# Patient Record
Sex: MALE | Race: WHITE | Employment: OTHER | URBAN - METROPOLITAN AREA
[De-identification: names, ages, dates, MRNs, and addresses within clinical notes are randomized per-mention and may not be internally consistent; named-entity substitution may affect disease eponyms.]

---

## 2022-06-23 ENCOUNTER — INITIAL CONSULT (OUTPATIENT)
Dept: CARDIOLOGY CLINIC | Age: 77
End: 2022-06-23
Payer: MEDICARE

## 2022-06-23 VITALS
BODY MASS INDEX: 28.88 KG/M2 | HEIGHT: 67 IN | HEART RATE: 61 BPM | SYSTOLIC BLOOD PRESSURE: 128 MMHG | WEIGHT: 184 LBS | DIASTOLIC BLOOD PRESSURE: 72 MMHG

## 2022-06-23 DIAGNOSIS — I25.10 ATHEROSCLEROSIS OF NATIVE CORONARY ARTERY OF NATIVE HEART WITHOUT ANGINA PECTORIS: ICD-10-CM

## 2022-06-23 DIAGNOSIS — Z76.89 ESTABLISHING CARE WITH NEW DOCTOR, ENCOUNTER FOR: Primary | ICD-10-CM

## 2022-06-23 DIAGNOSIS — I77.9 CAROTID ARTERY DISEASE, UNSPECIFIED LATERALITY (HCC): ICD-10-CM

## 2022-06-23 PROCEDURE — 99204 OFFICE O/P NEW MOD 45 MIN: CPT | Performed by: INTERNAL MEDICINE

## 2022-06-23 PROCEDURE — 1123F ACP DISCUSS/DSCN MKR DOCD: CPT | Performed by: INTERNAL MEDICINE

## 2022-06-23 PROCEDURE — 93000 ELECTROCARDIOGRAM COMPLETE: CPT | Performed by: INTERNAL MEDICINE

## 2022-06-23 RX ORDER — ROSUVASTATIN CALCIUM 40 MG/1
40 TABLET, COATED ORAL
COMMUNITY
Start: 2022-04-13

## 2022-06-23 RX ORDER — AMLODIPINE BESYLATE 5 MG/1
5 TABLET ORAL DAILY
COMMUNITY
Start: 2022-01-03

## 2022-06-23 RX ORDER — CELECOXIB 200 MG/1
200 CAPSULE ORAL 2 TIMES DAILY PRN
COMMUNITY
Start: 2022-05-16

## 2022-06-23 RX ORDER — TORSEMIDE 10 MG/1
10 TABLET ORAL DAILY
COMMUNITY
Start: 2022-04-13

## 2022-06-23 RX ORDER — DOXAZOSIN MESYLATE 4 MG/1
4 TABLET ORAL
COMMUNITY
Start: 2022-05-13

## 2022-06-23 RX ORDER — EZETIMIBE 10 MG/1
10 TABLET ORAL DAILY
COMMUNITY
Start: 2022-04-27

## 2022-06-23 RX ORDER — COLCHICINE 0.6 MG/1
CAPSULE ORAL AS NEEDED
COMMUNITY

## 2022-06-23 RX ORDER — OMEPRAZOLE 40 MG/1
40 CAPSULE, DELAYED RELEASE ORAL DAILY
COMMUNITY
Start: 2022-04-13

## 2022-06-23 NOTE — PROGRESS NOTES
ezetimibe (ZETIA) 10 MG tablet (Taking)    Class: Historical Med    rosuvastatin (CRESTOR) 40 MG tablet (Taking)    Class: Historical Med    torsemide (DEMADEX) 10 MG tablet (Taking)    Class: Historical Med              Past Medical History, Past Surgical History, Family history, Social History, and Medications were all reviewed with the patient today and updated as necessary. Allergies   Allergen Reactions    Lisinopril Swelling     angioedema  angioedema      Lorazepam Hallucinations    Sulfa Antibiotics Rash     No past medical history on file. No past surgical history on file. No family history on file. Social History     Tobacco Use    Smoking status: Former Smoker     Types: Cigarettes    Smokeless tobacco: Never Used   Substance Use Topics    Alcohol use: Not on file       ROS:    Review of Systems   Constitutional: Negative for fever. HENT: Negative for stridor. Eyes: Negative for pain. Cardiovascular: Negative for chest pain. Respiratory: Negative for cough. Endocrine: Negative for cold intolerance. Skin: Negative for nail changes. Musculoskeletal: Negative for arthritis. Gastrointestinal: Negative for abdominal pain. Genitourinary: Negative for dysuria. Neurological: Negative for aphonia. Psychiatric/Behavioral: Negative for altered mental status. Allergic/Immunologic: Negative for hives. PHYSICAL EXAM:   /72   Pulse 61   Ht 5' 7\" (1.702 m)   Wt 184 lb (83.5 kg)   BMI 28.82 kg/m²      Physical Exam  Vitals reviewed. HENT:      Head: Normocephalic. Right Ear: External ear normal.      Left Ear: External ear normal.      Nose: Nose normal.   Eyes:      General: No scleral icterus. Cardiovascular:      Heart sounds: Murmur heard. Pulmonary:      Effort: Pulmonary effort is normal.   Abdominal:      General: There is no distension. Musculoskeletal:      Cervical back: Neck supple. Skin:     General: Skin is warm.    Neurological:      Mental Status: He is alert. Mental status is at baseline. Medical problems and test results were reviewed with the patient today. No results found for any visits on 06/23/22. No results found for this or any previous visit (from the past 672 hour(s)). No results found for: CHOL, CHOLPOCT, CHOLX, CHLST, CHOLV, HDL, HDLPOC, HDLC, LDL, LDLC, VLDLC, VLDL, TGLX, TRIGL         PLAN    Kym Ramirez was seen today for consultation. Diagnoses and all orders for this visit:    Establishing care with new doctor, encounter for  -     EKG 12 Lead    Atherosclerotic heart disease of native coronary artery without angina pectoris  -     EKG 12 Lead    Carotid artery disease, unspecified laterality ([address]  -     Vascular duplex carotid left; Future      Return in about 6 months (around 12/23/2022). Thank you for allowing me to participate in this patient's care. Please call or contact me if there are any questions or concerns regarding the above.       Nikhil Graham MD  06/23/22  3:40 PM      Proofread, but unrecognized errors may exist.

## 2022-06-29 ASSESSMENT — ENCOUNTER SYMPTOMS
STRIDOR: 0
APHONIA: 0
COUGH: 0
NAIL CHANGES: 0
ABDOMINAL PAIN: 0
EYE PAIN: 0

## 2022-07-28 DIAGNOSIS — I77.9 CAROTID ARTERY DISEASE, UNSPECIFIED LATERALITY (HCC): Primary | ICD-10-CM

## 2022-09-02 ENCOUNTER — TELEPHONE (OUTPATIENT)
Dept: CARDIOLOGY CLINIC | Age: 77
End: 2022-09-02

## 2022-09-02 NOTE — TELEPHONE ENCOUNTER
Cardiac Clearance        Physician or Practice Requesting:[facility]  : Sandip Olivera.   Contact Phone Number: [phone]  Fax Number: [phone]  Date of Surgery/Procedure: TBD  Type of Surgery or Procedure: Spinal  Type of Anesthesia: Not Noted      Type of Clearance Requested:   Medication to Hold:Cardiologist to Determine  Days to Hold: Cardiologist to Determine

## 2022-09-02 NOTE — LETTER
[address] [address] [address] [place] [address] [address] [place] [address], [address] [place] [address]  [phone]  _____________________________________      PRE-OP RISK ASSESSMENT    uSsie Sampson  1945    Susie Sampson is scheduled for spinal with [facility]. Typically anticoagulation is held 72 hours prior to this type of procedure. As always, temporary discontinuation of anticoagulation therapy does place patient at minimally increased risk of embolic events. A risk-benefit decision should be made by the physician performing procedure when deciding to hold anticoagulation.     Cardiac symptoms are stable at the time of last appointment.                     Thank you,     Dr. Sandro Chew          9/7/2022  1:34 PM

## 2022-09-03 NOTE — TELEPHONE ENCOUNTER
Typically anticoagulation is held 72 hours prior to this type of procedure. As always, temporary discontinuation of anticoagulation therapy does place patient at minimally increased risk of embolic events. A risk-benefit decision should be made by the physician performing procedure when deciding to hold anticoagulation. Cardiac symptoms are stable at the time of last appointment.

## 2022-12-20 ENCOUNTER — OFFICE VISIT (OUTPATIENT)
Dept: CARDIOLOGY CLINIC | Age: 77
End: 2022-12-20
Payer: MEDICARE

## 2022-12-20 VITALS
HEART RATE: 68 BPM | BODY MASS INDEX: 28.41 KG/M2 | HEIGHT: 67 IN | WEIGHT: 181 LBS | DIASTOLIC BLOOD PRESSURE: 70 MMHG | SYSTOLIC BLOOD PRESSURE: 132 MMHG

## 2022-12-20 DIAGNOSIS — I20.9 ANGINA, CLASS III (HCC): Primary | ICD-10-CM

## 2022-12-20 DIAGNOSIS — I77.9 CAROTID ARTERY DISEASE, UNSPECIFIED LATERALITY (HCC): ICD-10-CM

## 2022-12-20 DIAGNOSIS — I35.0 AORTIC VALVE STENOSIS, ETIOLOGY OF CARDIAC VALVE DISEASE UNSPECIFIED: ICD-10-CM

## 2022-12-20 PROCEDURE — 99214 OFFICE O/P EST MOD 30 MIN: CPT | Performed by: INTERNAL MEDICINE

## 2022-12-20 PROCEDURE — 1123F ACP DISCUSS/DSCN MKR DOCD: CPT | Performed by: INTERNAL MEDICINE

## 2022-12-20 ASSESSMENT — ENCOUNTER SYMPTOMS
STRIDOR: 0
APHONIA: 0
COUGH: 0
NAIL CHANGES: 0
EYE PAIN: 0
ABDOMINAL PAIN: 0

## 2022-12-20 NOTE — PROGRESS NOTES
[address], [address]  PHONE: [phone]    SUBJECTIVE:   Maxwell Elizalde is a 65 y.o. male 1945      Chief Complaint   Patient presents with    Coronary Artery Disease    6 Month Follow-Up          Coronary Artery Disease and 6 Month Follow-Up   . History of present illness: 65 y.o. male presented for follow-up 12/20/22 he had recent lumbar surgery at [facility]. He plans on playing golf when he recovers from his most recent surgery. No shortness of breath or chest discomfort. He is having events of palpitations that he is concerned are consistent with atrial fibrillation. Interval history:  6/23/2022 previous history of coronary artery disease status post coronary artery bypass grafting in 1985 PCI in 2014. Additional history of paroxysmal atrial fibrillation. Carotid studies performed in the past with mildly elevated velocities. Cardiac History:  Coronary artery bypass grafting in 1985  PCI 2014 2018 cardioversion  Echocardiogram 5/2020 1/2022 echocardiogram [facility] ejection fraction 55 to 65% mild aortic stenosis.   Grade 2 diastolic dysfunction  Assessment:  Coronary artery disease  Key CAD CHF Meds            amLODIPine (NORVASC) 5 MG tablet (Taking)    Class: Historical Med    apixaban (ELIQUIS) 5 MG TABS tablet (Taking)    Class: Historical Med    doxazosin (CARDURA) 4 MG tablet (Taking)    Class: Historical Med    ezetimibe (ZETIA) 10 MG tablet (Taking)    Class: Historical Med    rosuvastatin (CRESTOR) 40 MG tablet (Taking)    Class: Historical Med    torsemide (DEMADEX) 10 MG tablet (Taking)    Class: Historical Med           Aortic stenosis  Serial echocardiograms   Per guidelines    Carotid disease  Moderate carotid disease  Serial imaging per guidelines    History of atrial fibrillation  Discussed wearable device  Key CAD CHF Meds            amLODIPine (NORVASC) 5 MG tablet (Taking)    Class: Historical Med    apixaban (ELIQUIS) 5 MG TABS tablet (Taking)    Class: Historical Med    doxazosin (CARDURA) 4 MG tablet (Taking)    Class: Historical Med    ezetimibe (ZETIA) 10 MG tablet (Taking)    Class: Historical Med    rosuvastatin (CRESTOR) 40 MG tablet (Taking)    Class: Historical Med    torsemide (DEMADEX) 10 MG tablet (Taking)    Class: Historical Med              Past Medical History, Past Surgical History, Family history, Social History, and Medications were all reviewed with the patient today and updated as necessary. Allergies   Allergen Reactions    Lisinopril Swelling     angioedema  angioedema      Lorazepam Hallucinations    Sulfa Antibiotics Rash     No past medical history on file. No past surgical history on file. No family history on file. Social History     Tobacco Use    Smoking status: Former     Types: Cigarettes    Smokeless tobacco: Never   Substance Use Topics    Alcohol use: Not on file       ROS:    Review of Systems   Constitutional: Negative for fever. HENT:  Negative for stridor. Eyes:  Negative for pain. Cardiovascular:  Negative for chest pain. Respiratory:  Negative for cough. Endocrine: Negative for cold intolerance. Skin:  Negative for nail changes. Musculoskeletal:  Negative for arthritis. Gastrointestinal:  Negative for abdominal pain. Genitourinary:  Negative for dysuria. Neurological:  Negative for aphonia. Psychiatric/Behavioral:  Negative for altered mental status. Allergic/Immunologic: Negative for hives. PHYSICAL EXAM:   /70   Pulse 68   Ht 5' 7\" (1.702 m)   Wt 181 lb (82.1 kg)   BMI 28.35 kg/m²      Physical Exam  Vitals reviewed. HENT:      Head: Normocephalic. Right Ear: External ear normal.      Left Ear: External ear normal.      Nose: Nose normal.   Eyes:      General: No scleral icterus. Cardiovascular:      Heart sounds: Murmur heard. Pulmonary:      Effort: Pulmonary effort is normal.   Abdominal:      General: There is no distension. Musculoskeletal:      Cervical back: Neck supple. Skin:     General: Skin is warm. Neurological:      Mental Status: He is alert. Mental status is at baseline. Medical problems and test results were reviewed with the patient today. No results found for any visits on 12/20/22. No results found for this or any previous visit (from the past 672 hour(s)). No results found for: CHOL, CHOLPOCT, CHOLX, CHLST, CHOLV, HDL, HDLPOC, HDLC, LDL, LDLC, VLDLC, VLDL, TGLX, TRIGL         PLAN    Arun Aguiar was seen today for coronary artery disease and 6 month follow-up. Diagnoses and all orders for this visit:    Angina, class III ([address]    Aortic valve stenosis, etiology of cardiac valve disease unspecified  -     Transthoracic echocardiogram (TTE) complete with contrast, bubble, strain, and 3D PRN; Future    Carotid artery disease, unspecified laterality ([address]  -     Vascular duplex carotid bilateral; Future    Return in about 6 months (around 6/20/2023). Thank you for allowing me to participate in this patient's care. Please call or contact me if there are any questions or concerns regarding the above.       Carley Prasad MD  12/20/22  6:42 PM      Proofread, but unrecognized errors may exist.

## 2023-06-28 ENCOUNTER — OFFICE VISIT (OUTPATIENT)
Age: 78
End: 2023-06-28
Payer: COMMERCIAL

## 2023-06-28 VITALS
HEIGHT: 67 IN | BODY MASS INDEX: 28.39 KG/M2 | DIASTOLIC BLOOD PRESSURE: 76 MMHG | HEART RATE: 61 BPM | SYSTOLIC BLOOD PRESSURE: 132 MMHG | WEIGHT: 180.9 LBS

## 2023-06-28 DIAGNOSIS — I35.0 AORTIC VALVE STENOSIS, ETIOLOGY OF CARDIAC VALVE DISEASE UNSPECIFIED: Primary | ICD-10-CM

## 2023-06-28 DIAGNOSIS — I25.10 ATHEROSCLEROSIS OF NATIVE CORONARY ARTERY OF NATIVE HEART WITHOUT ANGINA PECTORIS: ICD-10-CM

## 2023-06-28 DIAGNOSIS — I10 HYPERTENSION, ESSENTIAL: ICD-10-CM

## 2023-06-28 DIAGNOSIS — I77.9 CAROTID ARTERY DISEASE, UNSPECIFIED LATERALITY (HCC): ICD-10-CM

## 2023-06-28 DIAGNOSIS — I48.91 ATRIAL FIBRILLATION, UNSPECIFIED TYPE (HCC): ICD-10-CM

## 2023-06-28 PROCEDURE — 3075F SYST BP GE 130 - 139MM HG: CPT | Performed by: INTERNAL MEDICINE

## 2023-06-28 PROCEDURE — 1123F ACP DISCUSS/DSCN MKR DOCD: CPT | Performed by: INTERNAL MEDICINE

## 2023-06-28 PROCEDURE — 93000 ELECTROCARDIOGRAM COMPLETE: CPT | Performed by: INTERNAL MEDICINE

## 2023-06-28 PROCEDURE — 99214 OFFICE O/P EST MOD 30 MIN: CPT | Performed by: INTERNAL MEDICINE

## 2023-06-28 PROCEDURE — 3078F DIAST BP <80 MM HG: CPT | Performed by: INTERNAL MEDICINE

## 2023-06-28 RX ORDER — INCLISIRAN 284 MG/1.5ML
284 INJECTION, SOLUTION SUBCUTANEOUS
Qty: 284 ML | Refills: 1
Start: 2023-06-28

## 2023-06-28 RX ORDER — ACETAMINOPHEN 500 MG
500 TABLET ORAL EVERY 6 HOURS PRN
COMMUNITY

## 2023-06-28 ASSESSMENT — ENCOUNTER SYMPTOMS
ABDOMINAL PAIN: 0
APHONIA: 0
NAIL CHANGES: 0
COUGH: 0
EYE PAIN: 0
STRIDOR: 0

## 2023-11-07 ENCOUNTER — HOSPITAL ENCOUNTER (OUTPATIENT)
Age: 78
Setting detail: OBSERVATION
Discharge: HOME | End: 2023-11-08
Attending: EMERGENCY MEDICINE | Admitting: INTERNAL MEDICINE
Payer: MEDICARE

## 2023-11-07 ENCOUNTER — APPOINTMENT (OUTPATIENT)
Dept: GENERAL RADIOLOGY | Age: 78
End: 2023-11-07
Payer: MEDICARE

## 2023-11-07 ENCOUNTER — TELEPHONE (OUTPATIENT)
Age: 78
End: 2023-11-07

## 2023-11-07 DIAGNOSIS — I48.91 ATRIAL FIBRILLATION WITH RVR (HCC): Primary | ICD-10-CM

## 2023-11-07 DIAGNOSIS — I48.91 A-FIB (HCC): ICD-10-CM

## 2023-11-07 PROBLEM — I10 HYPERTENSION: Status: ACTIVE | Noted: 2023-11-07

## 2023-11-07 PROBLEM — I35.0 AORTIC STENOSIS, MILD: Status: ACTIVE | Noted: 2023-11-07

## 2023-11-07 PROBLEM — E78.5 HYPERLIPIDEMIA: Status: ACTIVE | Noted: 2023-11-07

## 2023-11-07 PROBLEM — I25.10 CORONARY ARTERY DISEASE INVOLVING NATIVE HEART: Status: ACTIVE | Noted: 2023-11-07

## 2023-11-07 PROBLEM — I51.89 DIASTOLIC DYSFUNCTION: Status: ACTIVE | Noted: 2023-11-07

## 2023-11-07 LAB
ANION GAP SERPL CALC-SCNC: 6 MMOL/L (ref 2–11)
BASOPHILS # BLD: 0 K/UL (ref 0–0.2)
BASOPHILS NFR BLD: 0 % (ref 0–2)
BUN SERPL-MCNC: 24 MG/DL (ref 8–23)
CALCIUM SERPL-MCNC: 9.2 MG/DL (ref 8.3–10.4)
CHLORIDE SERPL-SCNC: 104 MMOL/L (ref 101–110)
CO2 SERPL-SCNC: 27 MMOL/L (ref 21–32)
CREAT SERPL-MCNC: 1.4 MG/DL (ref 0.8–1.5)
DIFFERENTIAL METHOD BLD: ABNORMAL
EOSINOPHIL # BLD: 0.1 K/UL (ref 0–0.8)
EOSINOPHIL NFR BLD: 1 % (ref 0.5–7.8)
ERYTHROCYTE [DISTWIDTH] IN BLOOD BY AUTOMATED COUNT: 13.2 % (ref 11.9–14.6)
GLUCOSE SERPL-MCNC: 137 MG/DL (ref 65–100)
HCT VFR BLD AUTO: 34.4 % (ref 41.1–50.3)
HGB BLD-MCNC: 11.1 G/DL (ref 13.6–17.2)
IMM GRANULOCYTES # BLD AUTO: 0 K/UL (ref 0–0.5)
IMM GRANULOCYTES NFR BLD AUTO: 0 % (ref 0–5)
LYMPHOCYTES # BLD: 1.3 K/UL (ref 0.5–4.6)
LYMPHOCYTES NFR BLD: 17 % (ref 13–44)
MAGNESIUM SERPL-MCNC: 2.3 MG/DL (ref 1.8–2.4)
MCH RBC QN AUTO: 31.3 PG (ref 26.1–32.9)
MCHC RBC AUTO-ENTMCNC: 32.3 G/DL (ref 31.4–35)
MCV RBC AUTO: 96.9 FL (ref 82–102)
MONOCYTES # BLD: 0.7 K/UL (ref 0.1–1.3)
MONOCYTES NFR BLD: 9 % (ref 4–12)
NEUTS SEG # BLD: 5.4 K/UL (ref 1.7–8.2)
NEUTS SEG NFR BLD: 73 % (ref 43–78)
NRBC # BLD: 0 K/UL (ref 0–0.2)
PHOSPHATE SERPL-MCNC: 3.8 MG/DL (ref 2.3–3.7)
PLATELET # BLD AUTO: 478 K/UL (ref 150–450)
PMV BLD AUTO: 9.3 FL (ref 9.4–12.3)
POTASSIUM SERPL-SCNC: 4.2 MMOL/L (ref 3.5–5.1)
RBC # BLD AUTO: 3.55 M/UL (ref 4.23–5.6)
SODIUM SERPL-SCNC: 137 MMOL/L (ref 133–143)
TROPONIN I SERPL HS-MCNC: 14 PG/ML (ref 0–14)
TROPONIN I SERPL HS-MCNC: 18.4 PG/ML (ref 0–14)
TSH W FREE THYROID IF ABNORMAL: 0.8 UIU/ML (ref 0.36–3.74)
WBC # BLD AUTO: 7.6 K/UL (ref 4.3–11.1)

## 2023-11-07 PROCEDURE — 84100 ASSAY OF PHOSPHORUS: CPT

## 2023-11-07 PROCEDURE — 93005 ELECTROCARDIOGRAM TRACING: CPT | Performed by: EMERGENCY MEDICINE

## 2023-11-07 PROCEDURE — 85025 COMPLETE CBC W/AUTO DIFF WBC: CPT

## 2023-11-07 PROCEDURE — 96376 TX/PRO/DX INJ SAME DRUG ADON: CPT

## 2023-11-07 PROCEDURE — 84443 ASSAY THYROID STIM HORMONE: CPT

## 2023-11-07 PROCEDURE — 80048 BASIC METABOLIC PNL TOTAL CA: CPT

## 2023-11-07 PROCEDURE — 99204 OFFICE O/P NEW MOD 45 MIN: CPT | Performed by: INTERNAL MEDICINE

## 2023-11-07 PROCEDURE — 96374 THER/PROPH/DIAG INJ IV PUSH: CPT

## 2023-11-07 PROCEDURE — 99285 EMERGENCY DEPT VISIT HI MDM: CPT

## 2023-11-07 PROCEDURE — 2580000003 HC RX 258: Performed by: NURSE PRACTITIONER

## 2023-11-07 PROCEDURE — 96366 THER/PROPH/DIAG IV INF ADDON: CPT

## 2023-11-07 PROCEDURE — 83735 ASSAY OF MAGNESIUM: CPT

## 2023-11-07 PROCEDURE — 6370000000 HC RX 637 (ALT 250 FOR IP)

## 2023-11-07 PROCEDURE — 71045 X-RAY EXAM CHEST 1 VIEW: CPT

## 2023-11-07 PROCEDURE — 2500000003 HC RX 250 WO HCPCS: Performed by: NURSE PRACTITIONER

## 2023-11-07 PROCEDURE — 2500000003 HC RX 250 WO HCPCS: Performed by: EMERGENCY MEDICINE

## 2023-11-07 PROCEDURE — G0378 HOSPITAL OBSERVATION PER HR: HCPCS

## 2023-11-07 PROCEDURE — 84484 ASSAY OF TROPONIN QUANT: CPT

## 2023-11-07 PROCEDURE — 99214 OFFICE O/P EST MOD 30 MIN: CPT | Performed by: INTERNAL MEDICINE

## 2023-11-07 PROCEDURE — 2580000003 HC RX 258: Performed by: EMERGENCY MEDICINE

## 2023-11-07 PROCEDURE — 2580000003 HC RX 258

## 2023-11-07 PROCEDURE — 96365 THER/PROPH/DIAG IV INF INIT: CPT

## 2023-11-07 PROCEDURE — 36415 COLL VENOUS BLD VENIPUNCTURE: CPT

## 2023-11-07 RX ORDER — TORSEMIDE 20 MG/1
10 TABLET ORAL DAILY
Status: DISCONTINUED | OUTPATIENT
Start: 2023-11-08 | End: 2023-11-08 | Stop reason: HOSPADM

## 2023-11-07 RX ORDER — ONDANSETRON 4 MG/1
4 TABLET, ORALLY DISINTEGRATING ORAL EVERY 8 HOURS PRN
Status: DISCONTINUED | OUTPATIENT
Start: 2023-11-07 | End: 2023-11-08 | Stop reason: HOSPADM

## 2023-11-07 RX ORDER — MORPHINE SULFATE 4 MG/ML
4 INJECTION, SOLUTION INTRAMUSCULAR; INTRAVENOUS ONCE
Status: DISCONTINUED | OUTPATIENT
Start: 2023-11-07 | End: 2023-11-08 | Stop reason: HOSPADM

## 2023-11-07 RX ORDER — ERGOCALCIFEROL 1.25 MG/1
50000 CAPSULE ORAL WEEKLY
COMMUNITY

## 2023-11-07 RX ORDER — DOXAZOSIN MESYLATE 4 MG/1
4 TABLET ORAL NIGHTLY
Status: DISCONTINUED | OUTPATIENT
Start: 2023-11-07 | End: 2023-11-08 | Stop reason: HOSPADM

## 2023-11-07 RX ORDER — MAGNESIUM SULFATE IN WATER 40 MG/ML
2000 INJECTION, SOLUTION INTRAVENOUS PRN
Status: DISCONTINUED | OUTPATIENT
Start: 2023-11-07 | End: 2023-11-08 | Stop reason: HOSPADM

## 2023-11-07 RX ORDER — AMLODIPINE BESYLATE 5 MG/1
5 TABLET ORAL DAILY
Status: DISCONTINUED | OUTPATIENT
Start: 2023-11-07 | End: 2023-11-08 | Stop reason: HOSPADM

## 2023-11-07 RX ORDER — SODIUM CHLORIDE 0.9 % (FLUSH) 0.9 %
5-40 SYRINGE (ML) INJECTION PRN
Status: DISCONTINUED | OUTPATIENT
Start: 2023-11-07 | End: 2023-11-08 | Stop reason: HOSPADM

## 2023-11-07 RX ORDER — LOSARTAN POTASSIUM 50 MG/1
50 TABLET ORAL DAILY
Status: ON HOLD | COMMUNITY
End: 2023-11-08 | Stop reason: HOSPADM

## 2023-11-07 RX ORDER — NITROGLYCERIN 0.4 MG/1
0.4 TABLET SUBLINGUAL EVERY 5 MIN PRN
Status: DISCONTINUED | OUTPATIENT
Start: 2023-11-07 | End: 2023-11-08 | Stop reason: HOSPADM

## 2023-11-07 RX ORDER — POLYETHYLENE GLYCOL 3350 17 G/17G
17 POWDER, FOR SOLUTION ORAL DAILY PRN
Status: DISCONTINUED | OUTPATIENT
Start: 2023-11-07 | End: 2023-11-08 | Stop reason: HOSPADM

## 2023-11-07 RX ORDER — DILTIAZEM HYDROCHLORIDE 5 MG/ML
15 INJECTION INTRAVENOUS
Status: COMPLETED | OUTPATIENT
Start: 2023-11-07 | End: 2023-11-07

## 2023-11-07 RX ORDER — SODIUM CHLORIDE 0.9 % (FLUSH) 0.9 %
5-40 SYRINGE (ML) INJECTION EVERY 12 HOURS SCHEDULED
Status: DISCONTINUED | OUTPATIENT
Start: 2023-11-07 | End: 2023-11-08 | Stop reason: HOSPADM

## 2023-11-07 RX ORDER — ACETAMINOPHEN 325 MG/1
650 TABLET ORAL EVERY 6 HOURS PRN
Status: DISCONTINUED | OUTPATIENT
Start: 2023-11-07 | End: 2023-11-08 | Stop reason: HOSPADM

## 2023-11-07 RX ORDER — SODIUM CHLORIDE 9 MG/ML
INJECTION, SOLUTION INTRAVENOUS PRN
Status: DISCONTINUED | OUTPATIENT
Start: 2023-11-07 | End: 2023-11-08 | Stop reason: HOSPADM

## 2023-11-07 RX ORDER — FAMOTIDINE 20 MG/1
20 TABLET, FILM COATED ORAL EVERY 12 HOURS PRN
Status: DISCONTINUED | OUTPATIENT
Start: 2023-11-07 | End: 2023-11-08

## 2023-11-07 RX ORDER — EZETIMIBE 10 MG/1
10 TABLET ORAL DAILY
Status: DISCONTINUED | OUTPATIENT
Start: 2023-11-07 | End: 2023-11-08 | Stop reason: HOSPADM

## 2023-11-07 RX ORDER — ALLOPURINOL 300 MG/1
300 TABLET ORAL DAILY
COMMUNITY

## 2023-11-07 RX ORDER — POTASSIUM CHLORIDE 7.45 MG/ML
10 INJECTION INTRAVENOUS PRN
Status: DISCONTINUED | OUTPATIENT
Start: 2023-11-07 | End: 2023-11-08 | Stop reason: HOSPADM

## 2023-11-07 RX ORDER — POTASSIUM CHLORIDE 20 MEQ/1
40 TABLET, EXTENDED RELEASE ORAL PRN
Status: DISCONTINUED | OUTPATIENT
Start: 2023-11-07 | End: 2023-11-08 | Stop reason: HOSPADM

## 2023-11-07 RX ORDER — 0.9 % SODIUM CHLORIDE 0.9 %
1000 INTRAVENOUS SOLUTION INTRAVENOUS
Status: COMPLETED | OUTPATIENT
Start: 2023-11-07 | End: 2023-11-07

## 2023-11-07 RX ORDER — ONDANSETRON 2 MG/ML
4 INJECTION INTRAMUSCULAR; INTRAVENOUS EVERY 6 HOURS PRN
Status: DISCONTINUED | OUTPATIENT
Start: 2023-11-07 | End: 2023-11-08 | Stop reason: HOSPADM

## 2023-11-07 RX ORDER — ROSUVASTATIN CALCIUM 20 MG/1
40 TABLET, COATED ORAL NIGHTLY
Status: DISCONTINUED | OUTPATIENT
Start: 2023-11-07 | End: 2023-11-08 | Stop reason: HOSPADM

## 2023-11-07 RX ADMIN — SODIUM CHLORIDE, PRESERVATIVE FREE 10 ML: 5 INJECTION INTRAVENOUS at 20:05

## 2023-11-07 RX ADMIN — SODIUM CHLORIDE 15 MG/HR: 900 INJECTION, SOLUTION INTRAVENOUS at 21:47

## 2023-11-07 RX ADMIN — APIXABAN 5 MG: 5 TABLET, FILM COATED ORAL at 20:05

## 2023-11-07 RX ADMIN — SODIUM CHLORIDE 1000 ML: 9 INJECTION, SOLUTION INTRAVENOUS at 13:49

## 2023-11-07 RX ADMIN — SODIUM CHLORIDE 5 MG/HR: 900 INJECTION, SOLUTION INTRAVENOUS at 14:31

## 2023-11-07 RX ADMIN — ACETAMINOPHEN 650 MG: 325 TABLET ORAL at 20:04

## 2023-11-07 RX ADMIN — DOXAZOSIN 4 MG: 4 TABLET ORAL at 20:04

## 2023-11-07 RX ADMIN — DILTIAZEM HYDROCHLORIDE 15 MG: 5 INJECTION INTRAVENOUS at 13:42

## 2023-11-07 RX ADMIN — ROSUVASTATIN CALCIUM 40 MG: 20 TABLET, COATED ORAL at 20:05

## 2023-11-07 ASSESSMENT — ENCOUNTER SYMPTOMS
NAUSEA: 0
GASTROINTESTINAL NEGATIVE: 1
EYE ITCHING: 0
VOMITING: 0
CHEST TIGHTNESS: 0
DIARRHEA: 0
EYE DISCHARGE: 0
COUGH: 0
SHORTNESS OF BREATH: 0

## 2023-11-07 ASSESSMENT — LIFESTYLE VARIABLES
HOW OFTEN DO YOU HAVE A DRINK CONTAINING ALCOHOL: NEVER
HOW MANY STANDARD DRINKS CONTAINING ALCOHOL DO YOU HAVE ON A TYPICAL DAY: PATIENT DOES NOT DRINK

## 2023-11-07 ASSESSMENT — PAIN DESCRIPTION - DESCRIPTORS: DESCRIPTORS: ACHING;DISCOMFORT

## 2023-11-07 ASSESSMENT — PAIN DESCRIPTION - LOCATION: LOCATION: HEAD

## 2023-11-07 ASSESSMENT — PAIN SCALES - GENERAL: PAINLEVEL_OUTOF10: 3

## 2023-11-07 NOTE — TELEPHONE ENCOUNTER
Informed Dr. Melany Willis of below. Dr. Melany Willis agrees ER.  cgh  Informed pt of Dr. Jessie Ashford response. Prefer [facility]. ER Md will start work up. Let ER know you're UCD pt, A Fib and symptomatic. Pt and wife on speaker phone voice understanding and agreement with POC. Jose Owens

## 2023-11-07 NOTE — ED TRIAGE NOTES
Pt ambulatory to triage sent by cardiology for rapid heart rate. Pt denies C.P, SOB, N/V, diaphoresis, fever.  Pt reports extensive cardiac HX

## 2023-11-07 NOTE — TELEPHONE ENCOUNTER
Pt c/o A Fib x 3 days. Today 110-133 bpm.  Cannot get Apple Watch to do tracing. /82  Little light headed and affecting vision. Denies sob, palpitations. THR ~2 wks ago  Anticoagulated on Eliquis 5mg BID. Norvasc 5mg daily  Cardura 4mg daily  Torsemide 10mg daily. Drinking 2-3 C coffee and tea with caffeine. Encourage Hydration with water. Normally /> is ER visit. Will confirm with Dr. Farooq Marino.   Pt voiced understanding and thanks.   cgh
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
## 2023-11-07 NOTE — ED PROVIDER NOTES
Emergency Department Provider Note       PCP: Donovan Adan MD   Age: 65 y.o. Sex: male     DISPOSITION Decision To Admit 11/07/2023 02:52:43 PM       ICD-10-CM    1. Atrial fibrillation with RVR (HCC)  I48.91           Medical Decision Making     Complexity of Problems Addressed:  1 or more acute illnesses that pose a threat to life or bodily function. Data Reviewed and Analyzed:  I independently ordered and reviewed each unique test.  I reviewed external records: ED visit note from an outside group. I reviewed external records: provider visit note from PCP. I reviewed external records: provider visit note from outside specialist.  I reviewed external records: previous EKG including cardiologist interpretation. The patients assessment required an independent historian: Spouse. The reason they were needed is important historical information not provided by the patient. I interpreted the X-rays no acute finding on single view chest x-ray. Discussion of management or test interpretation. 26-year-old male patient presents with 2 to 3 days of palpitations  History of prior A-fib flutter episodes in the evaluation  Also has a history of coronary disease  And is status post coronary bypass    Work-up today reveals A-fib with RVR  Cardizem drip currently infusing for rate control  Lab work-up unremarkable  We will consult cardiology to admit  The patient was admitted and I have discussed patient management with the admitting provider. Risk of Complications and/or Morbidity of Patient Management:  Drug therapy given requiring intensive monitoring for toxicity. Discussion with external consultants. Chronic medical problems impacting care include coronary artery disease prior a flutter recent hip fracture repair. Shared medical decision making was utilized in creating the patients health plan today. History       66 yo male with hx of CAD, s/p CABG.   Afib/flutter with prior

## 2023-11-08 ENCOUNTER — APPOINTMENT (OUTPATIENT)
Dept: CARDIAC CATH/INVASIVE PROCEDURES | Age: 78
End: 2023-11-08
Attending: INTERNAL MEDICINE
Payer: MEDICARE

## 2023-11-08 VITALS
DIASTOLIC BLOOD PRESSURE: 75 MMHG | SYSTOLIC BLOOD PRESSURE: 124 MMHG | HEART RATE: 73 BPM | RESPIRATION RATE: 15 BRPM | TEMPERATURE: 99.3 F | HEIGHT: 67 IN | WEIGHT: 174.2 LBS | BODY MASS INDEX: 27.34 KG/M2 | OXYGEN SATURATION: 91 %

## 2023-11-08 PROBLEM — I48.91 ATRIAL FIBRILLATION WITH RAPID VENTRICULAR RESPONSE (HCC): Status: RESOLVED | Noted: 2023-11-07 | Resolved: 2023-11-08

## 2023-11-08 LAB
ANION GAP SERPL CALC-SCNC: 9 MMOL/L (ref 2–11)
BUN SERPL-MCNC: 16 MG/DL (ref 8–23)
CALCIUM SERPL-MCNC: 8.6 MG/DL (ref 8.3–10.4)
CHLORIDE SERPL-SCNC: 108 MMOL/L (ref 101–110)
CHOLEST SERPL-MCNC: 121 MG/DL
CO2 SERPL-SCNC: 23 MMOL/L (ref 21–32)
CREAT SERPL-MCNC: 1.2 MG/DL (ref 0.8–1.5)
ECHO AO ROOT DIAM: 3.3 CM
ECHO AO ROOT INDEX: 1.73 CM/M2
ECHO AV AREA PEAK VELOCITY: 0.9 CM2
ECHO AV AREA VTI: 1 CM2
ECHO AV AREA/BSA PEAK VELOCITY: 0.5 CM2/M2
ECHO AV AREA/BSA VTI: 0.5 CM2/M2
ECHO AV MEAN GRADIENT: 18 MMHG
ECHO AV MEAN VELOCITY: 2 M/S
ECHO AV PEAK GRADIENT: 36 MMHG
ECHO AV PEAK VELOCITY: 3 M/S
ECHO AV VELOCITY RATIO: 0.3
ECHO AV VTI: 64.9 CM
ECHO BSA: 1.93 M2
ECHO LVOT AREA: 3.1 CM2
ECHO LVOT AV VTI INDEX: 0.34
ECHO LVOT DIAM: 2 CM
ECHO LVOT MEAN GRADIENT: 2 MMHG
ECHO LVOT PEAK GRADIENT: 3 MMHG
ECHO LVOT PEAK VELOCITY: 0.9 M/S
ECHO LVOT STROKE VOLUME INDEX: 36.7 ML/M2
ECHO LVOT SV: 70 ML
ECHO LVOT VTI: 22.3 CM
EKG ATRIAL RATE: 74 BPM
EKG DIAGNOSIS: NORMAL
EKG P AXIS: 39 DEGREES
EKG P-R INTERVAL: 190 MS
EKG Q-T INTERVAL: 410 MS
EKG QRS DURATION: 84 MS
EKG QTC CALCULATION (BAZETT): 455 MS
EKG R AXIS: 250 DEGREES
EKG T AXIS: 109 DEGREES
EKG VENTRICULAR RATE: 74 BPM
ERYTHROCYTE [DISTWIDTH] IN BLOOD BY AUTOMATED COUNT: 13.5 % (ref 11.9–14.6)
EST. AVERAGE GLUCOSE BLD GHB EST-MCNC: 160 MG/DL
GLUCOSE SERPL-MCNC: 121 MG/DL (ref 65–100)
HBA1C MFR BLD: 7.2 % (ref 4.8–5.6)
HCT VFR BLD AUTO: 29.2 % (ref 41.1–50.3)
HDLC SERPL-MCNC: 33 MG/DL (ref 40–60)
HDLC SERPL: 3.7
HGB BLD-MCNC: 9.7 G/DL (ref 13.6–17.2)
LDLC SERPL CALC-MCNC: 68.8 MG/DL
MAGNESIUM SERPL-MCNC: 2.3 MG/DL (ref 1.8–2.4)
MCH RBC QN AUTO: 31.7 PG (ref 26.1–32.9)
MCHC RBC AUTO-ENTMCNC: 33.2 G/DL (ref 31.4–35)
MCV RBC AUTO: 95.4 FL (ref 82–102)
NRBC # BLD: 0 K/UL (ref 0–0.2)
PLATELET # BLD AUTO: 410 K/UL (ref 150–450)
PMV BLD AUTO: 9.3 FL (ref 9.4–12.3)
POTASSIUM SERPL-SCNC: 3.6 MMOL/L (ref 3.5–5.1)
RBC # BLD AUTO: 3.06 M/UL (ref 4.23–5.6)
SODIUM SERPL-SCNC: 140 MMOL/L (ref 133–143)
TRIGL SERPL-MCNC: 96 MG/DL (ref 35–150)
VLDLC SERPL CALC-MCNC: 19.2 MG/DL (ref 6–23)
WBC # BLD AUTO: 5.1 K/UL (ref 4.3–11.1)

## 2023-11-08 PROCEDURE — 6370000000 HC RX 637 (ALT 250 FOR IP)

## 2023-11-08 PROCEDURE — 99213 OFFICE O/P EST LOW 20 MIN: CPT | Performed by: INTERNAL MEDICINE

## 2023-11-08 PROCEDURE — 99152 MOD SED SAME PHYS/QHP 5/>YRS: CPT

## 2023-11-08 PROCEDURE — 83735 ASSAY OF MAGNESIUM: CPT

## 2023-11-08 PROCEDURE — 2580000003 HC RX 258: Performed by: NURSE PRACTITIONER

## 2023-11-08 PROCEDURE — 2580000003 HC RX 258

## 2023-11-08 PROCEDURE — 96368 THER/DIAG CONCURRENT INF: CPT

## 2023-11-08 PROCEDURE — G0378 HOSPITAL OBSERVATION PER HR: HCPCS

## 2023-11-08 PROCEDURE — 93010 ELECTROCARDIOGRAM REPORT: CPT | Performed by: INTERNAL MEDICINE

## 2023-11-08 PROCEDURE — 93325 DOPPLER ECHO COLOR FLOW MAPG: CPT | Performed by: INTERNAL MEDICINE

## 2023-11-08 PROCEDURE — 85027 COMPLETE CBC AUTOMATED: CPT

## 2023-11-08 PROCEDURE — 99152 MOD SED SAME PHYS/QHP 5/>YRS: CPT | Performed by: INTERNAL MEDICINE

## 2023-11-08 PROCEDURE — 6370000000 HC RX 637 (ALT 250 FOR IP): Performed by: INTERNAL MEDICINE

## 2023-11-08 PROCEDURE — 93005 ELECTROCARDIOGRAM TRACING: CPT | Performed by: INTERNAL MEDICINE

## 2023-11-08 PROCEDURE — 93312 ECHO TRANSESOPHAGEAL: CPT | Performed by: INTERNAL MEDICINE

## 2023-11-08 PROCEDURE — 92960 CARDIOVERSION ELECTRIC EXT: CPT | Performed by: INTERNAL MEDICINE

## 2023-11-08 PROCEDURE — 93325 DOPPLER ECHO COLOR FLOW MAPG: CPT

## 2023-11-08 PROCEDURE — 6360000002 HC RX W HCPCS: Performed by: INTERNAL MEDICINE

## 2023-11-08 PROCEDURE — 96366 THER/PROPH/DIAG IV INF ADDON: CPT

## 2023-11-08 PROCEDURE — 2500000003 HC RX 250 WO HCPCS: Performed by: NURSE PRACTITIONER

## 2023-11-08 PROCEDURE — 83036 HEMOGLOBIN GLYCOSYLATED A1C: CPT

## 2023-11-08 PROCEDURE — 93320 DOPPLER ECHO COMPLETE: CPT | Performed by: INTERNAL MEDICINE

## 2023-11-08 PROCEDURE — 80048 BASIC METABOLIC PNL TOTAL CA: CPT

## 2023-11-08 PROCEDURE — 36415 COLL VENOUS BLD VENIPUNCTURE: CPT

## 2023-11-08 PROCEDURE — 6370000000 HC RX 637 (ALT 250 FOR IP): Performed by: NURSE PRACTITIONER

## 2023-11-08 PROCEDURE — 80061 LIPID PANEL: CPT

## 2023-11-08 RX ORDER — METOPROLOL SUCCINATE 25 MG/1
25 TABLET, EXTENDED RELEASE ORAL DAILY
Status: DISCONTINUED | OUTPATIENT
Start: 2023-11-08 | End: 2023-11-08 | Stop reason: HOSPADM

## 2023-11-08 RX ORDER — FENTANYL CITRATE 50 UG/ML
INJECTION, SOLUTION INTRAMUSCULAR; INTRAVENOUS PRN
Status: COMPLETED | OUTPATIENT
Start: 2023-11-08 | End: 2023-11-08

## 2023-11-08 RX ORDER — MIDAZOLAM HYDROCHLORIDE 1 MG/ML
INJECTION INTRAMUSCULAR; INTRAVENOUS PRN
Status: COMPLETED | OUTPATIENT
Start: 2023-11-08 | End: 2023-11-08

## 2023-11-08 RX ORDER — METOPROLOL SUCCINATE 50 MG/1
TABLET, EXTENDED RELEASE ORAL
Qty: 90 TABLET | Refills: 1 | Status: SHIPPED | OUTPATIENT
Start: 2023-11-08

## 2023-11-08 RX ORDER — LIDOCAINE HYDROCHLORIDE 20 MG/ML
SOLUTION OROPHARYNGEAL PRN
Status: COMPLETED | OUTPATIENT
Start: 2023-11-08 | End: 2023-11-08

## 2023-11-08 RX ORDER — FAMOTIDINE 20 MG/1
20 TABLET, FILM COATED ORAL DAILY PRN
Status: DISCONTINUED | OUTPATIENT
Start: 2023-11-08 | End: 2023-11-08 | Stop reason: HOSPADM

## 2023-11-08 RX ORDER — AMLODIPINE BESYLATE 5 MG/1
5 TABLET ORAL DAILY
Qty: 30 TABLET | Refills: 3 | Status: SHIPPED | OUTPATIENT
Start: 2023-11-08

## 2023-11-08 RX ADMIN — EZETIMIBE 10 MG: 10 TABLET ORAL at 08:51

## 2023-11-08 RX ADMIN — MIDAZOLAM 2 MG: 1 INJECTION INTRAMUSCULAR; INTRAVENOUS at 11:45

## 2023-11-08 RX ADMIN — ACETAMINOPHEN 650 MG: 325 TABLET ORAL at 09:07

## 2023-11-08 RX ADMIN — SODIUM CHLORIDE 15 MG/HR: 900 INJECTION, SOLUTION INTRAVENOUS at 04:27

## 2023-11-08 RX ADMIN — APIXABAN 5 MG: 5 TABLET, FILM COATED ORAL at 08:51

## 2023-11-08 RX ADMIN — MIDAZOLAM 2 MG: 1 INJECTION INTRAMUSCULAR; INTRAVENOUS at 11:44

## 2023-11-08 RX ADMIN — SODIUM CHLORIDE, PRESERVATIVE FREE 10 ML: 5 INJECTION INTRAVENOUS at 09:11

## 2023-11-08 RX ADMIN — FENTANYL CITRATE 50 MCG: 50 INJECTION, SOLUTION INTRAMUSCULAR; INTRAVENOUS at 11:41

## 2023-11-08 RX ADMIN — MIDAZOLAM 2 MG: 1 INJECTION INTRAMUSCULAR; INTRAVENOUS at 11:41

## 2023-11-08 RX ADMIN — METOPROLOL SUCCINATE 25 MG: 25 TABLET, EXTENDED RELEASE ORAL at 16:19

## 2023-11-08 RX ADMIN — LIDOCAINE HYDROCHLORIDE 15 ML: 20 SOLUTION ORAL at 11:39

## 2023-11-08 RX ADMIN — ACETAMINOPHEN 650 MG: 325 TABLET ORAL at 15:23

## 2023-11-08 ASSESSMENT — PAIN SCALES - GENERAL
PAINLEVEL_OUTOF10: 0
PAINLEVEL_OUTOF10: 0
PAINLEVEL_OUTOF10: 3
PAINLEVEL_OUTOF10: 0
PAINLEVEL_OUTOF10: 0
PAINLEVEL_OUTOF10: 3
PAINLEVEL_OUTOF10: 0

## 2023-11-08 ASSESSMENT — PAIN DESCRIPTION - ORIENTATION
ORIENTATION: LEFT
ORIENTATION: LEFT

## 2023-11-08 ASSESSMENT — PAIN DESCRIPTION - DESCRIPTORS: DESCRIPTORS: ACHING;DISCOMFORT

## 2023-11-08 ASSESSMENT — PAIN DESCRIPTION - LOCATION
LOCATION: HIP
LOCATION: HIP

## 2023-11-08 NOTE — CARE COORDINATION
Patient admitted in Obs status with Afib with RVR. S/P successful DCCV. CM scanned medical record for discharge needs and met with spouse at bedside. Patient is sleeping. Spouse is current with [facility] (RN, PT) after partial hip replacement 3 wks ago. CM will remain available for consult. 11/08/23 4226   Service Assessment   Patient Orientation Alert and Oriented   Cognition Alert   History Provided By Medical Record   Primary Caregiver Self   Accompanied By/Relationship Unknown   Support Systems Spouse/Significant Other;Family Members;Friends/Neighbors   Patient's Healthcare Decision Maker is: Legal Next of Kin   PCP Verified by CM Yes   Last Visit to PCP Within last 3 months   Prior Functional Level Independent in ADLs/IADLs   Current Functional Level Independent in ADLs/IADLs   Can patient return to prior living arrangement Yes   Ability to make needs known: Good   Family able to assist with home care needs: Yes   Would you like for me to discuss the discharge plan with any other family members/significant others, and if so, who? No   Financial Resources Medicare   Community Resources None   Social/Functional History   Lives With Spouse   Receives Help From Family   Ambulation Assistance Independent   Transfer Assistance Independent   Mode of Transportation Car   Occupation Retired   Discharge Planning   [facility] None   Potential Assistance Needed N/A   DME Ordered? No   Potential Assistance Purchasing Medications No   Patient expects to be discharged to: [facility] Discharge   Transition of Care Consult (CM Consult) Discharge [facility] Discharge None   [facility] Information Provided?  No   Mode of Transport at Discharge Other (see comment)  (Car)   Confirm Follow Up Transport Family   Condition of Participation: Discharge Planning   The Plan for

## 2023-11-08 NOTE — DISCHARGE SUMMARY
[facility] Cardiology Discharge Summary     Patient ID:  Edison Hernandez  [id number]  57 y.o.  1945    Admit date: 2023    Discharge date:      Admitting Physician: Stefan Victor MD     Discharge Physician: ADDIE Starkey - CNP/Dr. Martin    Admission Diagnoses: A-fib [facility]) [I48.91]  Atrial fibrillation with RVR [facility]) [I48.91]    Discharge Diagnoses:     Principal Problem:    Atrial fibrillation with RVR ([facility])  Active Problems:    Hypertension    Hyperlipidemia    Coronary artery disease involving native heart    Aortic stenosis, mild    Diastolic dysfunction  Resolved Problems:    Atrial fibrillation with rapid ventricular response [facility])      Cardiology Procedures this admission:  TIM/Cardioversion  Consults: none    Hospital Course: Patient presented to the emergency department of [facility] with complaints of palpitations and shortness of breath. The patient was noted to be in atrial fibrillation with RVR on arrival.  A Cardizem bolus was given in the ER with good response. The patient was admitted to telemetry and Cardizem drip was continued. There was continued on Eliquis for  Tere     [id number] Score for Atrial Fibrillation Stroke Risk   Risk   Factors  Component Value   C CHF No 0   H HTN Yes 1   A2 Age >= 76 Yes,  (73 y.o.) 2   D DM No 0   S2 Prior Stroke/TIA No 0   V Vascular Disease No 0   A Age 77-78 No,  (73 y.o.) 0   Sc Sex male 0    [id number]  Score  3   Score last updated 23 4:05 PM EST      The patient was monitored on telemetry. TIM/Cardioversion was planned. That showed the followin/07/23    TIM W/ POSSIBLE CARDIOVERSION (PRN CONTRAST/BUBBLE/3D) 2023  1:53 PM (Final)    Interpretation Summary    Left Ventricle: Normal left ventricular systolic function with a visually estimated EF of 55 - 60%. Left ventricle size is normal. Mildly increased wall thickness. Normal wall motion. Aortic Valve: Mildly calcified cusp.  Mild stenosis of the 284 MG/1.5ML Comments:   Reason for Stopping:         amLODIPine (NORVASC) 5 MG tablet Comments:   Reason for Stopping:         celecoxib (CELEBREX) 200 MG capsule Comments:   Reason for Stopping:             Note some or all of the above medications that were set to stop by the system but the pt was not taking in the outpatient setting. Limitations in the system make it appear that we have stopped the medications when we have not.           Signed:  ADDIE Galan CNP  11/8/2023  4:03 PM

## 2023-11-08 NOTE — PROGRESS NOTES
TIM/CVN by Dr Sumi Yin  II ASA II Mallampati  6mg versed  50mcg fentanyl  Viscous Solution given at 1139  1 shock at 360 joules synced  Post cardioversion rhythm NSR  Pt tolerated well.

## 2023-11-08 NOTE — DISCHARGE INSTRUCTIONS
The patient felt much better back in sinus rhythm. The the day of discharge the patient was feeling much better and remained in sinus rhythm. The patient was seen and examined by Dr. Dada Andrade and was determined stable and ready for discharge home. The patient was instructed on the importance of medication compliance and outpatient follow up. The patient will follow up with [facility]. DISPOSITION: The patient is being discharged home in stable condition on a low saturated fat, low cholesterol and low salt diet. The patient is instructed to advance activities as tolerated to the limit of fatigue or shortness of breath. The patient is instructed to call the office or return to the ER for immediate evaluation for any severe shortness of breath, chest pain, prolonged palpitations, near syncope or syncope.

## 2023-11-08 NOTE — PROGRESS NOTES
Patient and wife expressing concern about Home Medications Allopurinol 300mg and Colchicine 0.3mg that he takes daily at home for Gout. He states he really needs these in order to ensure he does not have a flare up. Archana Villanueva MD messaged regarding this and he states to hold off on ordering until after scheduled procedure tomorrow. He states to notify day shift RN of this so they can remind MD tomorrow to order meds. Explained this to patient and he is agreeable.

## 2023-11-08 NOTE — PLAN OF CARE
Problem: Discharge Planning  Goal: Discharge to home or other facility with appropriate resources  Outcome: Progressing  Flowsheets  Taken 11/7/2023 2004 by Miri Hurd RN  Discharge to home or other facility with appropriate resources:   Identify barriers to discharge with patient and caregiver   Arrange for needed discharge resources and transportation as appropriate   Identify discharge learning needs (meds, wound care, etc)  Taken 11/7/2023 1749 by Marybel Floyd RN  Discharge to home or other facility with appropriate resources: Identify barriers to discharge with patient and caregiver     Problem: Safety - Adult  Goal: Free from fall injury  Outcome: Progressing     Problem: ABCDS Injury Assessment  Goal: Absence of physical injury  Outcome: Progressing     Problem: Pain  Goal: Verbalizes/displays adequate comfort level or baseline comfort level  Outcome: Progressing  Flowsheets (Taken 11/7/2023 2004)  Verbalizes/displays adequate comfort level or baseline comfort level:   Encourage patient to monitor pain and request assistance   Assess pain using appropriate pain scale   Administer analgesics based on type and severity of pain and evaluate response

## 2023-11-08 NOTE — PLAN OF CARE
Problem: Discharge Planning  Goal: Discharge to home or other facility with appropriate resources  Outcome: Completed  Flowsheets (Taken 11/8/2023 0739 by Efren Slade, RN)  Discharge to home or other facility with appropriate resources:   Identify barriers to discharge with patient and caregiver   Arrange for needed discharge resources and transportation as appropriate   Identify discharge learning needs (meds, wound care, etc)     Problem: Safety - Adult  Goal: Free from fall injury  Outcome: Completed  Flowsheets (Taken 11/8/2023 0739 by Efren Slade RN)  Free From Fall Injury: Instruct family/caregiver on patient safety     Problem: ABCDS Injury Assessment  Goal: Absence of physical injury  Outcome: Completed  Flowsheets (Taken 11/8/2023 0739 by Erfen Slade RN)  Absence of Physical Injury: Implement safety measures based on patient assessment     Problem: Pain  Goal: Verbalizes/displays adequate comfort level or baseline comfort level  Outcome: Completed  Flowsheets (Taken 11/8/2023 0739 by Efren Slade RN)  Verbalizes/displays adequate comfort level or baseline comfort level:   Encourage patient to monitor pain and request assistance   Assess pain using appropriate pain scale   Administer analgesics based on type and severity of pain and evaluate response   Implement non-pharmacological measures as appropriate and evaluate response

## 2023-11-09 ENCOUNTER — TELEPHONE (OUTPATIENT)
Age: 78
End: 2023-11-09

## 2023-11-09 ENCOUNTER — PATIENT MESSAGE (OUTPATIENT)
Age: 78
End: 2023-11-09

## 2023-11-09 NOTE — TELEPHONE ENCOUNTER
----- Message from [place], [place] sent at 11/9/2023  3:49 PM EST -----  Regarding: FW: Two questions after discharge  Contact: [phone]    ----- Message -----  From: Kervin Jimenez  Sent: 11/9/2023   2:06 PM EST  To: Brando Valle [facility] Cardiology Clinical Staff  Subject: Two questions after discharge                    Dr Carolee Juan performed a Cardio Conversion yesterday at [facility] due to prolonged rapid heartbeat/AFIb. Discharged to home after successful conversion. This happened two weeks post, partial hip replacement, after a fall on 10/23. Today i have experienced Afib (i have a ECG on my Apple watch) at 1:30 and wonder if i should send to you. (if so, via text or email. .. need your email. My PCP Dr Marcello Brar has left his practice so i am temporarily  without one. At my last visit with him in September, he told me to stop taking Doxazosin. However, my discharge paperwork from yesterday shows that i shooed continue it. So:  should i stop or start? Any other steps before my visit with you on Dec 18?   Thank you  Kervin Jimenez  [phone]

## 2023-11-09 NOTE — TELEPHONE ENCOUNTER
I called pt.he is back in sinus rhythm now and he feels ok. He will send an EKG tracing where he was in Afib earlier today he says he is back in Sinus rhythm now and feels okay.

## 2023-11-10 NOTE — TELEPHONE ENCOUNTER
Jalen Ramos MD  You 3 minutes ago (9:09 AM)       Ok to start cardura. Cant tell what rhythm he is in from strips. Have see someone next week.        Dejon Krishnamurthy MD

## 2023-11-10 NOTE — TELEPHONE ENCOUNTER
Pt.post CVERSION. Had Afib yesterday per Apple Watch. He just wanted  to review the tracings. He would also like to know if he is  suppose to start back on Cardura. It had previously been stopped then it ended up on recent DC summary to continue the med as at home however he had not been taking it. Does he need to resume it or stay off it?

## 2023-11-12 NOTE — TELEPHONE ENCOUNTER
You for the update, this patient will need to be worked in     [employer] to see where they can be fit in.    GARTH

## 2023-11-16 ENCOUNTER — OFFICE VISIT (OUTPATIENT)
Age: 78
End: 2023-11-16
Payer: MEDICARE

## 2023-11-16 VITALS
SYSTOLIC BLOOD PRESSURE: 114 MMHG | BODY MASS INDEX: 26.84 KG/M2 | WEIGHT: 171 LBS | HEIGHT: 67 IN | DIASTOLIC BLOOD PRESSURE: 46 MMHG | HEART RATE: 72 BPM

## 2023-11-16 DIAGNOSIS — I35.0 AORTIC VALVE STENOSIS, ETIOLOGY OF CARDIAC VALVE DISEASE UNSPECIFIED: Primary | ICD-10-CM

## 2023-11-16 DIAGNOSIS — I25.10 ATHEROSCLEROSIS OF NATIVE CORONARY ARTERY OF NATIVE HEART WITHOUT ANGINA PECTORIS: ICD-10-CM

## 2023-11-16 DIAGNOSIS — I48.91 ATRIAL FIBRILLATION, UNSPECIFIED TYPE (HCC): ICD-10-CM

## 2023-11-16 PROCEDURE — 1123F ACP DISCUSS/DSCN MKR DOCD: CPT | Performed by: INTERNAL MEDICINE

## 2023-11-16 PROCEDURE — 3074F SYST BP LT 130 MM HG: CPT | Performed by: INTERNAL MEDICINE

## 2023-11-16 PROCEDURE — 99214 OFFICE O/P EST MOD 30 MIN: CPT | Performed by: INTERNAL MEDICINE

## 2023-11-16 PROCEDURE — G8428 CUR MEDS NOT DOCUMENT: HCPCS | Performed by: INTERNAL MEDICINE

## 2023-11-16 PROCEDURE — 3078F DIAST BP <80 MM HG: CPT | Performed by: INTERNAL MEDICINE

## 2023-11-16 PROCEDURE — 1036F TOBACCO NON-USER: CPT | Performed by: INTERNAL MEDICINE

## 2023-11-16 PROCEDURE — G8417 CALC BMI ABV UP PARAM F/U: HCPCS | Performed by: INTERNAL MEDICINE

## 2023-11-16 PROCEDURE — G8484 FLU IMMUNIZE NO ADMIN: HCPCS | Performed by: INTERNAL MEDICINE

## 2023-11-16 RX ORDER — METOPROLOL SUCCINATE 50 MG/1
50 TABLET, EXTENDED RELEASE ORAL DAILY
Qty: 90 TABLET | Refills: 3 | Status: SHIPPED | OUTPATIENT
Start: 2023-11-16

## 2023-11-16 RX ORDER — TAMSULOSIN HYDROCHLORIDE 0.4 MG/1
CAPSULE ORAL
COMMUNITY
Start: 2023-09-13

## 2023-11-16 ASSESSMENT — ENCOUNTER SYMPTOMS
ABDOMINAL PAIN: 0
EYE PAIN: 0
NAIL CHANGES: 0
COUGH: 0
STRIDOR: 0
APHONIA: 0

## 2023-11-16 NOTE — PROGRESS NOTES
[address] [facility], [facility], [address]  PHONE: [phone]    SUBJECTIVE:   Shant Gottlieb is a 77 y.o. male 1945      Chief Complaint   Patient presents with    Follow-Up from Hospital    Chest Pain          Follow-Up from Hospital and Chest Pain   . History of present illness: 77 y.o. male presented for follow-up 11/16/23 recent episodes of atrial fibrillation underwent TIM cardioversion. Now on beta-blocker. Interval history:  6/23/2022 previous history of coronary artery disease status post coronary artery bypass grafting in 1985 PCI in 2014. Additional history of paroxysmal atrial fibrillation. Carotid studies performed in the past with mildly elevated velocities. Cardiac History:  Coronary artery bypass grafting in 1985  PCI 2014  2018 cardioversion  Echocardiogram 5/2020 1/2022 echocardiogram [facility] ejection fraction 55 to 65% mild aortic stenosis. Grade 2 diastolic dysfunction  7/47/8080 EKG: sinus rhythm, normal rate, normal OR intervals, nonspecific ST abnormality  Assessment:  Aortic stenosis  Recent transesophageal echocardiogram performed  Carotid disease  Coronary artery disease  Atrial fibrillation  Discussed electrophysiology referral  Patient would like to recover from his recent surgery and hospitalization and would like to determine if his atrial fibrillation is recurrent prior to proceeding with invasive procedures  HPL  ezetimibe - 10 MG  rosuvastatin - 40 MG   Hypertension  Key CAD CHF Meds            metoprolol succinate (TOPROL XL) 50 MG extended release tablet (Taking)    Sig - Route:  Take 1 tablet by mouth daily - Oral    apixaban (ELIQUIS) 5 MG TABS tablet (Taking)    Class: Historical Med    doxazosin (CARDURA) 4 MG tablet (Taking)    Class: Historical Med    ezetimibe (ZETIA) 10 MG tablet (Taking)    Class: Historical Med    rosuvastatin (CRESTOR) 40 MG tablet (Taking)    Class: Historical Med    torsemide (DEMADEX) 10 MG tablet (Taking)    Class:

## 2024-02-22 ENCOUNTER — OFFICE VISIT (OUTPATIENT)
Age: 79
End: 2024-02-22
Payer: MEDICARE

## 2024-02-22 VITALS
HEART RATE: 64 BPM | SYSTOLIC BLOOD PRESSURE: 152 MMHG | WEIGHT: 177.4 LBS | DIASTOLIC BLOOD PRESSURE: 72 MMHG | BODY MASS INDEX: 27.78 KG/M2

## 2024-02-22 DIAGNOSIS — I35.0 NONRHEUMATIC AORTIC VALVE STENOSIS: ICD-10-CM

## 2024-02-22 DIAGNOSIS — E78.5 HYPERLIPIDEMIA LDL GOAL <70: ICD-10-CM

## 2024-02-22 DIAGNOSIS — I25.10 ATHEROSCLEROSIS OF NATIVE CORONARY ARTERY OF NATIVE HEART WITHOUT ANGINA PECTORIS: ICD-10-CM

## 2024-02-22 DIAGNOSIS — I48.91 ATRIAL FIBRILLATION, UNSPECIFIED TYPE (HCC): Primary | ICD-10-CM

## 2024-02-22 PROCEDURE — 99214 OFFICE O/P EST MOD 30 MIN: CPT | Performed by: INTERNAL MEDICINE

## 2024-02-22 PROCEDURE — 3078F DIAST BP <80 MM HG: CPT | Performed by: INTERNAL MEDICINE

## 2024-02-22 PROCEDURE — 1036F TOBACCO NON-USER: CPT | Performed by: INTERNAL MEDICINE

## 2024-02-22 PROCEDURE — G8417 CALC BMI ABV UP PARAM F/U: HCPCS | Performed by: INTERNAL MEDICINE

## 2024-02-22 PROCEDURE — 93000 ELECTROCARDIOGRAM COMPLETE: CPT | Performed by: INTERNAL MEDICINE

## 2024-02-22 PROCEDURE — 3077F SYST BP >= 140 MM HG: CPT | Performed by: INTERNAL MEDICINE

## 2024-02-22 PROCEDURE — G8428 CUR MEDS NOT DOCUMENT: HCPCS | Performed by: INTERNAL MEDICINE

## 2024-02-22 PROCEDURE — 1123F ACP DISCUSS/DSCN MKR DOCD: CPT | Performed by: INTERNAL MEDICINE

## 2024-02-22 PROCEDURE — G8484 FLU IMMUNIZE NO ADMIN: HCPCS | Performed by: INTERNAL MEDICINE

## 2024-02-22 ASSESSMENT — ENCOUNTER SYMPTOMS
APHONIA: 0
NAIL CHANGES: 0
EYE PAIN: 0
COUGH: 0
ABDOMINAL PAIN: 0
STRIDOR: 0

## 2024-02-22 NOTE — PROGRESS NOTES
2 [facility], [address] [place] [address]  PHONE: [phone]    SUBJECTIVE:   Joaquim Ricci is a 78 y.o. male 1945      Chief Complaint   Patient presents with    Coronary Artery Disease    Atrial Fibrillation      Coronary Artery Disease and Atrial Fibrillation   .    History of present illness: 78 y.o. male presented for follow-up 2/22/24 Hip surgery TIM cardioversion.  We discussed electrophysiology referral in the past however he was recovering from his hip surgery at that time.  He is now doing well but is having mobility issues and possibly having another hip surgery in the near future.  He plans to travel to [place] later this year.    Interval history:  6/23/2022 previous history of coronary artery disease status post coronary artery bypass grafting in 1985 PCI in 2014.  Additional history of paroxysmal atrial fibrillation.  Carotid studies performed in the past with mildly elevated velocities.      Cardiac History:  Coronary artery bypass grafting in 1985  PCI 2014 2018 cardioversion  Echocardiogram 5/2020 1/2022 echocardiogram [facility] ejection fraction 55 to 65% mild aortic stenosis.  Grade 2 diastolic dysfunction  6/28/2023 EKG: sinus rhythm, normal rate, normal ME intervals, nonspecific ST abnormality  2/22/2024 EKG sinus bradycardia with rate variation  Assessment:  Aortic stenosis  Mild   Carotid disease  Coronary artery disease  Controlled   Atrial fibrillation  Discussed electrophysiology referral patient not interested at this time   Not a candidate for class I antiarrhythmic drugs due to history of coronary disease  HPL  ezetimibe - 10 MG  rosuvastatin - 40 MG   Hypertension  Key CAD CHF Meds            metoprolol succinate (TOPROL XL) 50 MG extended release tablet (Taking)    Sig - Route: Take 1 tablet by mouth daily - Oral    apixaban (ELIQUIS) 5 MG TABS tablet (Taking)    Class: Historical Med    doxazosin (CARDURA) 4 MG tablet (Taking)    Class: Historical Med    ezetimibe

## 2024-03-18 ENCOUNTER — TELEPHONE (OUTPATIENT)
Age: 79
End: 2024-03-18

## 2024-03-18 NOTE — TELEPHONE ENCOUNTER
MEDICAL    Practice       [facility]   Provider       Murphy Jc  Procedure    Extraction and bone graphing with iv sedation  (1 extraction and 1 graph on tooth #3)  Date          3/26/24  Medication    Look at all meds and advise them and pt what to stop taking and for how long      Fax:  [phone]

## 2024-03-20 ENCOUNTER — TELEPHONE (OUTPATIENT)
Age: 79
End: 2024-03-20

## 2024-03-20 NOTE — TELEPHONE ENCOUNTER
Pre-Operative Risk Assessment Using 2014 ACC/AHA Guidelines     Emergent procedure: No  Active Cardiac Condition including decompensated HF, Arrhythmia, MI <3 weeks, severe valve disease: No  Risk Level of Procedure: low    Perioperative management and sedation is at the discretion of the .    [id number] Score for Atrial Fibrillation Stroke Risk 3    The procedure should be performed on anticoagulation and antiplatelet therapy if possible.  When interruption of novel anticoagulation therapy is deemed necessary, the agent is usually stopped 2 days before the procedure ( and is restarted postoperatively as soon as bleeding risk allows, typically 12 to 24 hours after surgery.     As always, temporary discontinuation of anticoagulation therapy does place patient at minimally increased risk of embolic events.  A risk-benefit decision should be made by the  performing procedure when deciding to hold anticoagulation.

## 2024-03-20 NOTE — TELEPHONE ENCOUNTER
Marylu with [facility] [place] called stating they have not received the earlier faxed Surgical Risk Assessment.    Please re-fax to [phone]

## 2024-07-11 ENCOUNTER — TELEPHONE (OUTPATIENT)
Age: 79
End: 2024-07-11

## 2024-07-11 NOTE — TELEPHONE ENCOUNTER
Cardiac Clearance        Physician or Practice Requesting: Dr Murphy Jc  : Marylu  Contact Phone Number: [phone]  Fax Number: [phone]  Date of Surgery/Procedure: TBD / 7/16/24  Type of Surgery or Procedure : Implant  Type of Anesthesia: Conscious sedation  Type of Clearance Requested: Cardiac Clearance and Medication Hold  Medication to Hold: Eliquis  Days to Hold: 2 days prior

## 2024-07-11 NOTE — TELEPHONE ENCOUNTER
Signed         Pre-Operative Risk Assessment Using 2014 ACC/AHA Guidelines      Emergent procedure: No  Active Cardiac Condition including decompensated HF, Arrhythmia, MI <3 weeks, severe valve disease: No  Risk Level of Procedure: low     Perioperative management and sedation is at the discretion of the .     [id number] Score for Atrial Fibrillation Stroke Risk 3     The procedure should be performed on anticoagulation and antiplatelet therapy if possible.  When interruption of novel anticoagulation therapy is deemed necessary, the agent is usually stopped 2 days before the procedure ( and is restarted postoperatively as soon as bleeding risk allows, typically 12 to 24 hours after surgery.      As always, temporary discontinuation of anticoagulation therapy does place patient at minimally increased risk of embolic events.  A risk-benefit decision should be made by the  performing procedure when deciding to hold anticoagulation.

## 2024-08-14 ENCOUNTER — TELEPHONE (OUTPATIENT)
Age: 79
End: 2024-08-14

## 2024-08-14 NOTE — TELEPHONE ENCOUNTER
Pt states in [place] visiting family and notes increased brenner with steps, walking 100 ft gets out of breath.   HR may be speeding up. Apple watch notes inconclusive.   Hx of A Fib.   Niece is NP and nephew is naturopath. They with pt agree pt is stable to travel home.  Pt does not want to go to hospital in [place].   Reviewed monitor sx.  Send in strips per Apple Watch, prn.   Hydrate with water and reduce caffeine intake To ER for worsening sx. FU 8/23/24 Dr. Eloy MONTERROSO. Offered 8/21/24.  Pt declines [place] appt. Pt voiced understanding and agreement with POC.  Informed pt will check Dr. Lyons's schedule for Fri 8/19 and update pt if appt becomes available. Pt voiced understanding and thanks.  cgh

## 2024-08-14 NOTE — TELEPHONE ENCOUNTER
Pt would like to come in Friday because they are having weakness and sob upon exertion stated it statrted when they got to [place] Sunday

## 2024-08-16 NOTE — TELEPHONE ENCOUNTER
Informed pt sooner f/u not available. Pt voiced understanding. Stats feeling about the same.  Scheduled Zoom f/u Tues with PCP. Advise ER for any worsening, concerning sx. Pt voiced understanding and agreement with POC.  cgh

## 2024-08-19 ENCOUNTER — OFFICE VISIT (OUTPATIENT)
Age: 79
End: 2024-08-19
Payer: MEDICARE

## 2024-08-19 ENCOUNTER — TELEPHONE (OUTPATIENT)
Age: 79
End: 2024-08-19

## 2024-08-19 VITALS
DIASTOLIC BLOOD PRESSURE: 60 MMHG | HEART RATE: 68 BPM | SYSTOLIC BLOOD PRESSURE: 130 MMHG | BODY MASS INDEX: 29.51 KG/M2 | WEIGHT: 188 LBS | HEIGHT: 67 IN

## 2024-08-19 DIAGNOSIS — E78.5 HYPERLIPIDEMIA LDL GOAL <70: ICD-10-CM

## 2024-08-19 DIAGNOSIS — R06.09 DOE (DYSPNEA ON EXERTION): ICD-10-CM

## 2024-08-19 DIAGNOSIS — I77.9 CAROTID ARTERY DISEASE, UNSPECIFIED LATERALITY (HCC): ICD-10-CM

## 2024-08-19 DIAGNOSIS — I35.0 NONRHEUMATIC AORTIC VALVE STENOSIS: ICD-10-CM

## 2024-08-19 DIAGNOSIS — R01.1 HEART MURMUR: ICD-10-CM

## 2024-08-19 DIAGNOSIS — I48.91 ATRIAL FIBRILLATION, UNSPECIFIED TYPE (HCC): Primary | ICD-10-CM

## 2024-08-19 PROCEDURE — 3078F DIAST BP <80 MM HG: CPT | Performed by: INTERNAL MEDICINE

## 2024-08-19 PROCEDURE — 1036F TOBACCO NON-USER: CPT | Performed by: INTERNAL MEDICINE

## 2024-08-19 PROCEDURE — 99214 OFFICE O/P EST MOD 30 MIN: CPT | Performed by: INTERNAL MEDICINE

## 2024-08-19 PROCEDURE — 1123F ACP DISCUSS/DSCN MKR DOCD: CPT | Performed by: INTERNAL MEDICINE

## 2024-08-19 PROCEDURE — 93000 ELECTROCARDIOGRAM COMPLETE: CPT | Performed by: INTERNAL MEDICINE

## 2024-08-19 PROCEDURE — G8428 CUR MEDS NOT DOCUMENT: HCPCS | Performed by: INTERNAL MEDICINE

## 2024-08-19 PROCEDURE — 3075F SYST BP GE 130 - 139MM HG: CPT | Performed by: INTERNAL MEDICINE

## 2024-08-19 PROCEDURE — G8417 CALC BMI ABV UP PARAM F/U: HCPCS | Performed by: INTERNAL MEDICINE

## 2024-08-19 RX ORDER — PHENOL 1.4 %
1 AEROSOL, SPRAY (ML) MUCOUS MEMBRANE DAILY
COMMUNITY

## 2024-08-19 RX ORDER — FERROUS SULFATE 325(65) MG
325 TABLET ORAL
COMMUNITY

## 2024-08-19 RX ORDER — ALENDRONATE SODIUM 70 MG/1
70 TABLET ORAL
COMMUNITY
Start: 2024-01-18 | End: 2025-01-17

## 2024-08-19 ASSESSMENT — ENCOUNTER SYMPTOMS
NAIL CHANGES: 0
COUGH: 0
STRIDOR: 0
ABDOMINAL PAIN: 0
APHONIA: 0
EYE PAIN: 0

## 2024-08-19 NOTE — PROGRESS NOTES
test results were reviewed with the patient today.     No results found for any visits on 08/19/24.      No results found for this or any previous visit (from the past 672 hour(s)).    Lab Results   Component Value Date/Time    CHOL 121 11/08/2023 04:25 AM    HDL 33 11/08/2023 04:25 AM    LDL 68.8 11/08/2023 04:25 AM    VLDL 19.2 11/08/2023 04:25 AM            NORA March was seen today for hypertension.    Diagnoses and all orders for this visit:    Atrial fibrillation, unspecified type (HCC)  -     EKG 12 Lead  -     CBC with Auto Differential; Future  -     Brain Natriuretic Peptide; Future  -     Basic Metabolic Panel; Future  -     Iron and TIBC; Future  -     Ferritin; Future  -     Transferrin; Future  -     TSH with Reflex; Future    Nonrheumatic aortic valve stenosis  -     EKG 12 Lead    Carotid artery disease, unspecified laterality (HCC)  -     EKG 12 Lead    Hyperlipidemia LDL goal <70  -     EKG 12 Lead    Heart murmur  -     Echo (TTE) complete (PRN contrast/bubble/strain/3D); Future    CHAPMAN (dyspnea on exertion)  -     CBC with Auto Differential; Future  -     Brain Natriuretic Peptide; Future  -     Basic Metabolic Panel; Future  -     Iron and TIBC; Future  -     Ferritin; Future  -     Transferrin; Future  -     TSH with Reflex; Future  -     XR CHEST PA LAT (2 VIEWS); Future        Return in about 2 weeks (around 9/2/2024).       Thank you for allowing me to participate in this patient's care.  Please call or contact me if there are any questions or concerns regarding the above.      Armaan Lyons MD  08/20/24  7:10 AM      Proofread, but unrecognized errors may exist.

## 2024-08-19 NOTE — TELEPHONE ENCOUNTER
Triage scheduled appt for pt to see Dr. Lyons today at 4:30 at [place] office. Pt confirms appt date, time, and location.

## 2024-08-19 NOTE — TELEPHONE ENCOUNTER
Patient spoke with triage nurse last week. States he is till having low BP and extreme tiredness with and without exertion.

## 2024-08-19 NOTE — TELEPHONE ENCOUNTER
Pt c/o continued fatigue. BP has been running low but states it seems better today.    8/16  105/36           127/46    8/17  135/52           115/49           115/40    8/18  137/62           108/47           140/63    8/19  144/57    Has appt scheduled 8/23. Asking for ealier appt if available.

## 2024-08-20 ENCOUNTER — HOSPITAL ENCOUNTER (OUTPATIENT)
Dept: GENERAL RADIOLOGY | Age: 79
Discharge: HOME | End: 2024-08-23
Payer: MEDICARE

## 2024-08-20 DIAGNOSIS — R06.09 DOE (DYSPNEA ON EXERTION): ICD-10-CM

## 2024-08-20 DIAGNOSIS — I48.91 ATRIAL FIBRILLATION, UNSPECIFIED TYPE (HCC): ICD-10-CM

## 2024-08-20 LAB
ANION GAP SERPL CALC-SCNC: 10 MMOL/L (ref 9–18)
BASOPHILS # BLD: 0 K/UL (ref 0–0.2)
BASOPHILS NFR BLD: 1 % (ref 0–2)
BUN SERPL-MCNC: 33 MG/DL (ref 8–23)
CALCIUM SERPL-MCNC: 9 MG/DL (ref 8.8–10.2)
CHLORIDE SERPL-SCNC: 106 MMOL/L (ref 98–107)
CO2 SERPL-SCNC: 26 MMOL/L (ref 20–28)
CREAT SERPL-MCNC: 1.35 MG/DL (ref 0.8–1.3)
DIFFERENTIAL METHOD BLD: ABNORMAL
EOSINOPHIL # BLD: 0.1 K/UL (ref 0–0.8)
EOSINOPHIL NFR BLD: 3 % (ref 0.5–7.8)
ERYTHROCYTE [DISTWIDTH] IN BLOOD BY AUTOMATED COUNT: 15.8 % (ref 11.9–14.6)
FERRITIN SERPL-MCNC: 55 NG/ML (ref 8–388)
GLUCOSE SERPL-MCNC: 141 MG/DL (ref 70–99)
HCT VFR BLD AUTO: 28.6 % (ref 41.1–50.3)
HGB BLD-MCNC: 8.6 G/DL (ref 13.6–17.2)
IMM GRANULOCYTES # BLD AUTO: 0 K/UL (ref 0–0.5)
IMM GRANULOCYTES NFR BLD AUTO: 0 % (ref 0–5)
IRON SATN MFR SERPL: 14 % (ref 20–50)
IRON SERPL-MCNC: 44 UG/DL (ref 35–100)
LYMPHOCYTES # BLD: 1.3 K/UL (ref 0.5–4.6)
LYMPHOCYTES NFR BLD: 31 % (ref 13–44)
MCH RBC QN AUTO: 31.6 PG (ref 26.1–32.9)
MCHC RBC AUTO-ENTMCNC: 30.1 G/DL (ref 31.4–35)
MCV RBC AUTO: 105.1 FL (ref 82–102)
MONOCYTES # BLD: 0.6 K/UL (ref 0.1–1.3)
MONOCYTES NFR BLD: 15 % (ref 4–12)
NEUTS SEG # BLD: 2.2 K/UL (ref 1.7–8.2)
NEUTS SEG NFR BLD: 50 % (ref 43–78)
NRBC # BLD: 0 K/UL (ref 0–0.2)
NT PRO BNP: 611 PG/ML (ref 0–450)
PLATELET # BLD AUTO: 176 K/UL (ref 150–450)
PMV BLD AUTO: 10.6 FL (ref 9.4–12.3)
POTASSIUM SERPL-SCNC: 4.5 MMOL/L (ref 3.5–5.1)
RBC # BLD AUTO: 2.72 M/UL (ref 4.23–5.6)
SODIUM SERPL-SCNC: 141 MMOL/L (ref 136–145)
TIBC SERPL-MCNC: 320 UG/DL (ref 240–450)
TRANSFERRIN SERPL-MCNC: 266 MG/DL (ref 200–360)
TSH W FREE THYROID IF ABNORMAL: 1.67 UIU/ML (ref 0.27–4.2)
UIBC SERPL-MCNC: 276 UG/DL (ref 112–347)
WBC # BLD AUTO: 4.3 K/UL (ref 4.3–11.1)

## 2024-08-20 PROCEDURE — 71046 X-RAY EXAM CHEST 2 VIEWS: CPT

## 2024-08-20 NOTE — RESULT ENCOUNTER NOTE
Your most recent chest x-ray shows no major abnormalities.    Please let me know if you have additional questions or concerns.    Armaan Lyons MD

## 2024-08-22 ENCOUNTER — TELEPHONE (OUTPATIENT)
Age: 79
End: 2024-08-22

## 2024-08-22 NOTE — TELEPHONE ENCOUNTER
I spoke with the pt and let him know about the iron infusions. Pt is not seen by gastro but he prefers for his PCP to put in the referral because he is in [place]. I let the pt know that is may be a few days before he gets his echo results. Pt voiced understanding. Iron infusion referral is ready for signature.

## 2024-08-22 NOTE — TELEPHONE ENCOUNTER
----- Message from Dr. Armaan Lyons MD sent at 8/21/2024  7:06 AM EDT -----  Please let the patient know he is anemic this may account for his symptoms.  Please let him know I would recommend getting an iron infusion now.  Also please inquire if he has a gastroenterologist he sees on a regular basis.  We would like him to get evaluated by gastroenterologist to see if he is losing blood in his gastrointestinal tract.  This is probably been going on for some time.  A referral will be placed if he needs a gastroenterologist.  Please let him know we will hold off on invasive procedures at this time due to his anemia.

## 2024-08-26 ENCOUNTER — TELEPHONE (OUTPATIENT)
Age: 79
End: 2024-08-26

## 2024-08-26 ENCOUNTER — PATIENT MESSAGE (OUTPATIENT)
Age: 79
End: 2024-08-26

## 2024-08-26 NOTE — TELEPHONE ENCOUNTER
ND spoke with pt and assured him the iron infusion would be sent today. Referral was signed and faxed this afternoon.

## 2024-08-26 NOTE — TELEPHONE ENCOUNTER
Spoke with pt. He stated he would like the referral placed for an Iron Infusion ASAP. He states he has sent a message to his primary care and requested they place a referral for him to see a gastro doctor. Took note. Dr Lyons's Medical assistant has placed orders for infusions as directed.

## 2024-08-27 DIAGNOSIS — D50.9 IRON DEFICIENCY ANEMIA, UNSPECIFIED IRON DEFICIENCY ANEMIA TYPE: Primary | ICD-10-CM

## 2024-08-28 ENCOUNTER — PATIENT MESSAGE (OUTPATIENT)
Age: 79
End: 2024-08-28

## 2024-09-04 ENCOUNTER — OFFICE VISIT (OUTPATIENT)
Age: 79
End: 2024-09-04
Payer: MEDICARE

## 2024-09-04 VITALS
HEIGHT: 67 IN | WEIGHT: 181.8 LBS | BODY MASS INDEX: 28.53 KG/M2 | DIASTOLIC BLOOD PRESSURE: 50 MMHG | HEART RATE: 50 BPM | SYSTOLIC BLOOD PRESSURE: 124 MMHG

## 2024-09-04 DIAGNOSIS — I48.91 ATRIAL FIBRILLATION, UNSPECIFIED TYPE (HCC): Primary | ICD-10-CM

## 2024-09-04 DIAGNOSIS — R01.1 HEART MURMUR: ICD-10-CM

## 2024-09-04 DIAGNOSIS — I77.9 CAROTID ARTERY DISEASE, UNSPECIFIED LATERALITY (HCC): ICD-10-CM

## 2024-09-04 DIAGNOSIS — I35.0 AORTIC VALVE STENOSIS, ETIOLOGY OF CARDIAC VALVE DISEASE UNSPECIFIED: ICD-10-CM

## 2024-09-04 DIAGNOSIS — D64.9 ANEMIA, UNSPECIFIED TYPE: ICD-10-CM

## 2024-09-04 LAB
BASOPHILS # BLD: 0 K/UL (ref 0–0.2)
BASOPHILS NFR BLD: 1 % (ref 0–2)
DIFFERENTIAL METHOD BLD: ABNORMAL
EOSINOPHIL # BLD: 0.1 K/UL (ref 0–0.8)
EOSINOPHIL NFR BLD: 2 % (ref 0.5–7.8)
ERYTHROCYTE [DISTWIDTH] IN BLOOD BY AUTOMATED COUNT: 14.6 % (ref 11.9–14.6)
HCT VFR BLD AUTO: 30 % (ref 41.1–50.3)
HGB BLD-MCNC: 9 G/DL (ref 13.6–17.2)
IMM GRANULOCYTES # BLD AUTO: 0 K/UL (ref 0–0.5)
IMM GRANULOCYTES NFR BLD AUTO: 0 % (ref 0–5)
LYMPHOCYTES # BLD: 1.1 K/UL (ref 0.5–4.6)
LYMPHOCYTES NFR BLD: 27 % (ref 13–44)
MCH RBC QN AUTO: 31.8 PG (ref 26.1–32.9)
MCHC RBC AUTO-ENTMCNC: 30 G/DL (ref 31.4–35)
MCV RBC AUTO: 106 FL (ref 82–102)
MONOCYTES # BLD: 0.5 K/UL (ref 0.1–1.3)
MONOCYTES NFR BLD: 13 % (ref 4–12)
NEUTS SEG # BLD: 2.3 K/UL (ref 1.7–8.2)
NEUTS SEG NFR BLD: 57 % (ref 43–78)
NRBC # BLD: 0 K/UL (ref 0–0.2)
PLATELET # BLD AUTO: 202 K/UL (ref 150–450)
PMV BLD AUTO: 10.3 FL (ref 9.4–12.3)
RBC # BLD AUTO: 2.83 M/UL (ref 4.23–5.6)
WBC # BLD AUTO: 4.1 K/UL (ref 4.3–11.1)

## 2024-09-04 PROCEDURE — G8417 CALC BMI ABV UP PARAM F/U: HCPCS | Performed by: INTERNAL MEDICINE

## 2024-09-04 PROCEDURE — 1036F TOBACCO NON-USER: CPT | Performed by: INTERNAL MEDICINE

## 2024-09-04 PROCEDURE — G8428 CUR MEDS NOT DOCUMENT: HCPCS | Performed by: INTERNAL MEDICINE

## 2024-09-04 PROCEDURE — 1123F ACP DISCUSS/DSCN MKR DOCD: CPT | Performed by: INTERNAL MEDICINE

## 2024-09-04 PROCEDURE — 3078F DIAST BP <80 MM HG: CPT | Performed by: INTERNAL MEDICINE

## 2024-09-04 PROCEDURE — 99214 OFFICE O/P EST MOD 30 MIN: CPT | Performed by: INTERNAL MEDICINE

## 2024-09-04 PROCEDURE — 3074F SYST BP LT 130 MM HG: CPT | Performed by: INTERNAL MEDICINE

## 2024-09-04 RX ORDER — ASCORBIC ACID 1000 MG
50 TABLET ORAL 2 TIMES DAILY
COMMUNITY

## 2024-09-04 ASSESSMENT — ENCOUNTER SYMPTOMS
EYE PAIN: 0
STRIDOR: 0
NAIL CHANGES: 0
APHONIA: 0
ABDOMINAL PAIN: 0
COUGH: 0

## 2024-09-04 NOTE — PROGRESS NOTES
2 [facility], [address] [place] [address]  PHONE: [phone]    SUBJECTIVE:   Joaquim Ricci is a 78 y.o. male 1945      Chief Complaint   Patient presents with    Results     Echo/Labs    Hypertension    Hyperlipidemia      Results (Echo/Labs), Hypertension, and Hyperlipidemia   .    History of present illness: 78 y.o. male presented for follow-up 9/4/24 at his last appointment he presented with shortness of breath he is found to have diminished hemoglobin.  Iron infusion therapies are recommended additionally we recommended following up with gastroenterology.  Interval history:  6/23/2022 previous history of coronary artery disease status post coronary artery bypass grafting in 1985 PCI in 2014.  Additional history of paroxysmal atrial fibrillation.  Carotid studies performed in the past with mildly elevated velocities.      Cardiac History:  Coronary artery bypass grafting in 1985  PCI 2014 2018 cardioversion  Echocardiogram 5/2020 1/2022 echocardiogram [facility] ejection fraction 55 to 65% mild aortic stenosis.  Grade 2 diastolic dysfunction  6/28/2023 EKG: sinus rhythm, normal rate, normal CO intervals, nonspecific ST abnormality  2/22/2024 EKG sinus bradycardia with rate variation  8/19/2024 EKG sinus bradycardia PACs.  8/22/2024 echocardiogram ejection fraction 79% moderate aortic stenosis with mean gradient 29 mmHg moderate mitral regurgitation  Assessment:  Anemia  Plan for GI workup iron infusion therapy.  Possible candidate for Watchman due to history of atrial fibrillation.  Previous surgical details unknown however transesophageal echocardiogram was performed in November 2023 with atrial appendage noted  Iron infusion therapy planned  [id number] Score for Atrial Fibrillation Stroke Risk 3.   Aortic stenosis  Moderate to severe AS serial imaging plan with repeat echocardiogram   Carotid disease  Coronary artery disease  Atrial fibrillation  HPL  ezetimibe - 10 MG  rosuvastatin - 40 MG

## 2024-09-09 ENCOUNTER — NURSE ONLY (OUTPATIENT)
Age: 79
End: 2024-09-09
Payer: MEDICARE

## 2024-09-09 VITALS
SYSTOLIC BLOOD PRESSURE: 148 MMHG | DIASTOLIC BLOOD PRESSURE: 69 MMHG | OXYGEN SATURATION: 98 % | HEART RATE: 58 BPM | TEMPERATURE: 98.1 F | RESPIRATION RATE: 16 BRPM

## 2024-09-09 DIAGNOSIS — D50.9 IRON DEFICIENCY ANEMIA, UNSPECIFIED IRON DEFICIENCY ANEMIA TYPE: Primary | ICD-10-CM

## 2024-09-09 PROCEDURE — 96374 THER/PROPH/DIAG INJ IV PUSH: CPT | Performed by: PSYCHIATRY & NEUROLOGY

## 2024-09-09 RX ORDER — SODIUM CHLORIDE 9 MG/ML
INJECTION, SOLUTION INTRAVENOUS CONTINUOUS
Status: CANCELLED | OUTPATIENT
Start: 2024-09-09

## 2024-09-09 RX ORDER — EPINEPHRINE 1 MG/ML
0.3 INJECTION, SOLUTION, CONCENTRATE INTRAVENOUS PRN
Status: DISCONTINUED | OUTPATIENT
Start: 2024-09-09 | End: 2024-09-09 | Stop reason: HOSPADM

## 2024-09-09 RX ORDER — DIPHENHYDRAMINE HYDROCHLORIDE 50 MG/ML
50 INJECTION INTRAMUSCULAR; INTRAVENOUS
Status: CANCELLED | OUTPATIENT
Start: 2024-09-09

## 2024-09-09 RX ORDER — SODIUM CHLORIDE 9 MG/ML
5-250 INJECTION, SOLUTION INTRAVENOUS PRN
Status: CANCELLED | OUTPATIENT
Start: 2024-09-09

## 2024-09-09 RX ORDER — HEPARIN 100 UNIT/ML
500 SYRINGE INTRAVENOUS PRN
Status: CANCELLED | OUTPATIENT
Start: 2024-09-09

## 2024-09-09 RX ORDER — SODIUM CHLORIDE 0.9 % (FLUSH) 0.9 %
5-40 SYRINGE (ML) INJECTION PRN
Status: DISCONTINUED | OUTPATIENT
Start: 2024-09-09 | End: 2024-09-09 | Stop reason: HOSPADM

## 2024-09-09 RX ORDER — DIPHENHYDRAMINE HYDROCHLORIDE 50 MG/ML
50 INJECTION INTRAMUSCULAR; INTRAVENOUS
Status: DISCONTINUED | OUTPATIENT
Start: 2024-09-09 | End: 2024-09-09 | Stop reason: HOSPADM

## 2024-09-09 RX ORDER — SODIUM CHLORIDE 9 MG/ML
5-250 INJECTION, SOLUTION INTRAVENOUS PRN
Status: DISCONTINUED | OUTPATIENT
Start: 2024-09-09 | End: 2024-09-09 | Stop reason: HOSPADM

## 2024-09-09 RX ORDER — ALBUTEROL SULFATE 90 UG/1
4 AEROSOL, METERED RESPIRATORY (INHALATION) PRN
Status: DISCONTINUED | OUTPATIENT
Start: 2024-09-09 | End: 2024-09-09 | Stop reason: HOSPADM

## 2024-09-09 RX ORDER — EPINEPHRINE 1 MG/ML
0.3 INJECTION, SOLUTION, CONCENTRATE INTRAVENOUS PRN
Status: CANCELLED | OUTPATIENT
Start: 2024-09-09

## 2024-09-09 RX ORDER — ACETAMINOPHEN 325 MG/1
650 TABLET ORAL
Status: CANCELLED | OUTPATIENT
Start: 2024-09-09

## 2024-09-09 RX ORDER — ACETAMINOPHEN 325 MG/1
650 TABLET ORAL
Status: DISCONTINUED | OUTPATIENT
Start: 2024-09-09 | End: 2024-09-09 | Stop reason: HOSPADM

## 2024-09-09 RX ORDER — ONDANSETRON 2 MG/ML
8 INJECTION INTRAMUSCULAR; INTRAVENOUS
Status: DISCONTINUED | OUTPATIENT
Start: 2024-09-09 | End: 2024-09-09 | Stop reason: HOSPADM

## 2024-09-09 RX ORDER — SODIUM CHLORIDE 0.9 % (FLUSH) 0.9 %
5-40 SYRINGE (ML) INJECTION PRN
Status: CANCELLED | OUTPATIENT
Start: 2024-09-09

## 2024-09-09 RX ORDER — HEPARIN 100 UNIT/ML
500 SYRINGE INTRAVENOUS PRN
Status: DISCONTINUED | OUTPATIENT
Start: 2024-09-09 | End: 2024-09-09 | Stop reason: HOSPADM

## 2024-09-09 RX ORDER — ONDANSETRON 2 MG/ML
8 INJECTION INTRAMUSCULAR; INTRAVENOUS
Status: CANCELLED | OUTPATIENT
Start: 2024-09-09

## 2024-09-09 RX ORDER — SODIUM CHLORIDE 9 MG/ML
INJECTION, SOLUTION INTRAVENOUS CONTINUOUS
Status: DISCONTINUED | OUTPATIENT
Start: 2024-09-09 | End: 2024-09-09 | Stop reason: HOSPADM

## 2024-09-09 RX ORDER — ALBUTEROL SULFATE 90 UG/1
4 AEROSOL, METERED RESPIRATORY (INHALATION) PRN
Status: CANCELLED | OUTPATIENT
Start: 2024-09-09

## 2024-09-10 DIAGNOSIS — D64.9 ANEMIA, UNSPECIFIED TYPE: ICD-10-CM

## 2024-09-10 LAB
BASOPHILS # BLD: 0 K/UL (ref 0–0.2)
BASOPHILS NFR BLD: 1 % (ref 0–2)
DIFFERENTIAL METHOD BLD: ABNORMAL
EOSINOPHIL # BLD: 0.1 K/UL (ref 0–0.8)
EOSINOPHIL NFR BLD: 2 % (ref 0.5–7.8)
ERYTHROCYTE [DISTWIDTH] IN BLOOD BY AUTOMATED COUNT: 14.2 % (ref 11.9–14.6)
HCT VFR BLD AUTO: 29.7 % (ref 41.1–50.3)
HGB BLD-MCNC: 9.2 G/DL (ref 13.6–17.2)
IMM GRANULOCYTES # BLD AUTO: 0 K/UL (ref 0–0.5)
IMM GRANULOCYTES NFR BLD AUTO: 0 % (ref 0–5)
LYMPHOCYTES # BLD: 1.1 K/UL (ref 0.5–4.6)
LYMPHOCYTES NFR BLD: 25 % (ref 13–44)
MCH RBC QN AUTO: 32.7 PG (ref 26.1–32.9)
MCHC RBC AUTO-ENTMCNC: 31 G/DL (ref 31.4–35)
MCV RBC AUTO: 105.7 FL (ref 82–102)
MONOCYTES # BLD: 0.5 K/UL (ref 0.1–1.3)
MONOCYTES NFR BLD: 11 % (ref 4–12)
NEUTS SEG # BLD: 2.6 K/UL (ref 1.7–8.2)
NEUTS SEG NFR BLD: 61 % (ref 43–78)
NRBC # BLD: 0 K/UL (ref 0–0.2)
PLATELET # BLD AUTO: 181 K/UL (ref 150–450)
PMV BLD AUTO: 10.7 FL (ref 9.4–12.3)
RBC # BLD AUTO: 2.81 M/UL (ref 4.23–5.6)
WBC # BLD AUTO: 4.3 K/UL (ref 4.3–11.1)

## 2024-09-11 ENCOUNTER — NURSE ONLY (OUTPATIENT)
Age: 79
End: 2024-09-11

## 2024-09-11 VITALS
DIASTOLIC BLOOD PRESSURE: 62 MMHG | SYSTOLIC BLOOD PRESSURE: 137 MMHG | RESPIRATION RATE: 17 BRPM | TEMPERATURE: 97.7 F | OXYGEN SATURATION: 98 % | HEART RATE: 53 BPM

## 2024-09-11 DIAGNOSIS — D50.9 IRON DEFICIENCY ANEMIA, UNSPECIFIED IRON DEFICIENCY ANEMIA TYPE: Primary | ICD-10-CM

## 2024-09-11 RX ORDER — EPINEPHRINE 1 MG/ML
0.3 INJECTION, SOLUTION, CONCENTRATE INTRAVENOUS PRN
Status: CANCELLED | OUTPATIENT
Start: 2024-09-11

## 2024-09-11 RX ORDER — ONDANSETRON 2 MG/ML
8 INJECTION INTRAMUSCULAR; INTRAVENOUS
Status: CANCELLED | OUTPATIENT
Start: 2024-09-11

## 2024-09-11 RX ORDER — ACETAMINOPHEN 325 MG/1
650 TABLET ORAL
Status: DISCONTINUED | OUTPATIENT
Start: 2024-09-11 | End: 2024-09-11 | Stop reason: HOSPADM

## 2024-09-11 RX ORDER — SODIUM CHLORIDE 9 MG/ML
5-250 INJECTION, SOLUTION INTRAVENOUS PRN
Status: DISCONTINUED | OUTPATIENT
Start: 2024-09-11 | End: 2024-09-11 | Stop reason: HOSPADM

## 2024-09-11 RX ORDER — SODIUM CHLORIDE 0.9 % (FLUSH) 0.9 %
5-40 SYRINGE (ML) INJECTION PRN
Status: CANCELLED | OUTPATIENT
Start: 2024-09-11

## 2024-09-11 RX ORDER — SODIUM CHLORIDE 9 MG/ML
INJECTION, SOLUTION INTRAVENOUS CONTINUOUS
Status: DISCONTINUED | OUTPATIENT
Start: 2024-09-11 | End: 2024-09-11 | Stop reason: HOSPADM

## 2024-09-11 RX ORDER — ALBUTEROL SULFATE 90 UG/1
4 AEROSOL, METERED RESPIRATORY (INHALATION) PRN
Status: DISCONTINUED | OUTPATIENT
Start: 2024-09-11 | End: 2024-09-11 | Stop reason: HOSPADM

## 2024-09-11 RX ORDER — SODIUM CHLORIDE 9 MG/ML
INJECTION, SOLUTION INTRAVENOUS CONTINUOUS
Status: CANCELLED | OUTPATIENT
Start: 2024-09-11

## 2024-09-11 RX ORDER — ALBUTEROL SULFATE 90 UG/1
4 AEROSOL, METERED RESPIRATORY (INHALATION) PRN
Status: CANCELLED | OUTPATIENT
Start: 2024-09-11

## 2024-09-11 RX ORDER — SODIUM CHLORIDE 9 MG/ML
5-250 INJECTION, SOLUTION INTRAVENOUS PRN
Status: CANCELLED | OUTPATIENT
Start: 2024-09-11

## 2024-09-11 RX ORDER — EPINEPHRINE 1 MG/ML
0.3 INJECTION, SOLUTION, CONCENTRATE INTRAVENOUS PRN
Status: DISCONTINUED | OUTPATIENT
Start: 2024-09-11 | End: 2024-09-11 | Stop reason: HOSPADM

## 2024-09-11 RX ORDER — SODIUM CHLORIDE 0.9 % (FLUSH) 0.9 %
5-40 SYRINGE (ML) INJECTION PRN
Status: DISCONTINUED | OUTPATIENT
Start: 2024-09-11 | End: 2024-09-11 | Stop reason: HOSPADM

## 2024-09-11 RX ORDER — DIPHENHYDRAMINE HYDROCHLORIDE 50 MG/ML
50 INJECTION INTRAMUSCULAR; INTRAVENOUS
Status: DISCONTINUED | OUTPATIENT
Start: 2024-09-11 | End: 2024-09-11 | Stop reason: HOSPADM

## 2024-09-11 RX ORDER — HEPARIN 100 UNIT/ML
500 SYRINGE INTRAVENOUS PRN
Status: DISCONTINUED | OUTPATIENT
Start: 2024-09-11 | End: 2024-09-11 | Stop reason: HOSPADM

## 2024-09-11 RX ORDER — DIPHENHYDRAMINE HYDROCHLORIDE 50 MG/ML
50 INJECTION INTRAMUSCULAR; INTRAVENOUS
Status: CANCELLED | OUTPATIENT
Start: 2024-09-11

## 2024-09-11 RX ORDER — HEPARIN 100 UNIT/ML
500 SYRINGE INTRAVENOUS PRN
Status: CANCELLED | OUTPATIENT
Start: 2024-09-11

## 2024-09-11 RX ORDER — ONDANSETRON 2 MG/ML
8 INJECTION INTRAMUSCULAR; INTRAVENOUS
Status: DISCONTINUED | OUTPATIENT
Start: 2024-09-11 | End: 2024-09-11 | Stop reason: HOSPADM

## 2024-09-11 RX ORDER — ACETAMINOPHEN 325 MG/1
650 TABLET ORAL
Status: CANCELLED | OUTPATIENT
Start: 2024-09-11

## 2024-09-13 ENCOUNTER — NURSE ONLY (OUTPATIENT)
Age: 79
End: 2024-09-13

## 2024-09-13 VITALS
OXYGEN SATURATION: 98 % | SYSTOLIC BLOOD PRESSURE: 136 MMHG | DIASTOLIC BLOOD PRESSURE: 76 MMHG | RESPIRATION RATE: 16 BRPM | TEMPERATURE: 97.7 F | HEART RATE: 52 BPM

## 2024-09-13 DIAGNOSIS — D50.9 IRON DEFICIENCY ANEMIA, UNSPECIFIED IRON DEFICIENCY ANEMIA TYPE: Primary | ICD-10-CM

## 2024-09-13 RX ORDER — SODIUM CHLORIDE 9 MG/ML
5-250 INJECTION, SOLUTION INTRAVENOUS PRN
OUTPATIENT
Start: 2024-09-13

## 2024-09-13 RX ORDER — SODIUM CHLORIDE 0.9 % (FLUSH) 0.9 %
5-40 SYRINGE (ML) INJECTION PRN
OUTPATIENT
Start: 2024-09-13

## 2024-09-13 RX ORDER — ACETAMINOPHEN 325 MG/1
650 TABLET ORAL
OUTPATIENT
Start: 2024-09-13

## 2024-09-13 RX ORDER — HEPARIN 100 UNIT/ML
500 SYRINGE INTRAVENOUS PRN
OUTPATIENT
Start: 2024-09-13

## 2024-09-13 RX ORDER — ALBUTEROL SULFATE 90 UG/1
4 AEROSOL, METERED RESPIRATORY (INHALATION) PRN
Status: DISCONTINUED | OUTPATIENT
Start: 2024-09-13 | End: 2024-09-13 | Stop reason: HOSPADM

## 2024-09-13 RX ORDER — SODIUM CHLORIDE 9 MG/ML
5-250 INJECTION, SOLUTION INTRAVENOUS PRN
Status: DISCONTINUED | OUTPATIENT
Start: 2024-09-13 | End: 2024-09-13 | Stop reason: HOSPADM

## 2024-09-13 RX ORDER — SODIUM CHLORIDE 0.9 % (FLUSH) 0.9 %
5-40 SYRINGE (ML) INJECTION PRN
Status: DISCONTINUED | OUTPATIENT
Start: 2024-09-13 | End: 2024-09-13 | Stop reason: HOSPADM

## 2024-09-13 RX ORDER — ONDANSETRON 2 MG/ML
8 INJECTION INTRAMUSCULAR; INTRAVENOUS
Status: DISCONTINUED | OUTPATIENT
Start: 2024-09-13 | End: 2024-09-13 | Stop reason: HOSPADM

## 2024-09-13 RX ORDER — SODIUM CHLORIDE 9 MG/ML
INJECTION, SOLUTION INTRAVENOUS CONTINUOUS
Status: DISCONTINUED | OUTPATIENT
Start: 2024-09-13 | End: 2024-09-13 | Stop reason: HOSPADM

## 2024-09-13 RX ORDER — DIPHENHYDRAMINE HYDROCHLORIDE 50 MG/ML
50 INJECTION INTRAMUSCULAR; INTRAVENOUS
OUTPATIENT
Start: 2024-09-13

## 2024-09-13 RX ORDER — DIPHENHYDRAMINE HYDROCHLORIDE 50 MG/ML
50 INJECTION INTRAMUSCULAR; INTRAVENOUS
Status: DISCONTINUED | OUTPATIENT
Start: 2024-09-13 | End: 2024-09-13 | Stop reason: HOSPADM

## 2024-09-13 RX ORDER — ONDANSETRON 2 MG/ML
8 INJECTION INTRAMUSCULAR; INTRAVENOUS
OUTPATIENT
Start: 2024-09-13

## 2024-09-13 RX ORDER — EPINEPHRINE 1 MG/ML
0.3 INJECTION, SOLUTION, CONCENTRATE INTRAVENOUS PRN
Status: DISCONTINUED | OUTPATIENT
Start: 2024-09-13 | End: 2024-09-13 | Stop reason: HOSPADM

## 2024-09-13 RX ORDER — SODIUM CHLORIDE 9 MG/ML
INJECTION, SOLUTION INTRAVENOUS CONTINUOUS
OUTPATIENT
Start: 2024-09-13

## 2024-09-13 RX ORDER — HEPARIN 100 UNIT/ML
500 SYRINGE INTRAVENOUS PRN
Status: DISCONTINUED | OUTPATIENT
Start: 2024-09-13 | End: 2024-09-13 | Stop reason: HOSPADM

## 2024-09-13 RX ORDER — ACETAMINOPHEN 325 MG/1
650 TABLET ORAL
Status: DISCONTINUED | OUTPATIENT
Start: 2024-09-13 | End: 2024-09-13 | Stop reason: HOSPADM

## 2024-09-13 RX ORDER — ALBUTEROL SULFATE 90 UG/1
4 AEROSOL, METERED RESPIRATORY (INHALATION) PRN
OUTPATIENT
Start: 2024-09-13

## 2024-09-13 RX ORDER — EPINEPHRINE 1 MG/ML
0.3 INJECTION, SOLUTION, CONCENTRATE INTRAVENOUS PRN
OUTPATIENT
Start: 2024-09-13

## 2024-09-16 ENCOUNTER — NURSE ONLY (OUTPATIENT)
Age: 79
End: 2024-09-16

## 2024-09-16 VITALS
DIASTOLIC BLOOD PRESSURE: 65 MMHG | SYSTOLIC BLOOD PRESSURE: 147 MMHG | HEART RATE: 53 BPM | TEMPERATURE: 98.1 F | OXYGEN SATURATION: 97 % | RESPIRATION RATE: 16 BRPM

## 2024-09-16 DIAGNOSIS — D50.9 IRON DEFICIENCY ANEMIA, UNSPECIFIED IRON DEFICIENCY ANEMIA TYPE: Primary | ICD-10-CM

## 2024-09-16 RX ORDER — HEPARIN 100 UNIT/ML
500 SYRINGE INTRAVENOUS PRN
Status: CANCELLED | OUTPATIENT
Start: 2024-09-16

## 2024-09-16 RX ORDER — ALBUTEROL SULFATE 90 UG/1
4 INHALANT RESPIRATORY (INHALATION) PRN
Status: DISCONTINUED | OUTPATIENT
Start: 2024-09-16 | End: 2024-09-16 | Stop reason: HOSPADM

## 2024-09-16 RX ORDER — SODIUM CHLORIDE 9 MG/ML
5-250 INJECTION, SOLUTION INTRAVENOUS PRN
Status: DISCONTINUED | OUTPATIENT
Start: 2024-09-16 | End: 2024-09-16 | Stop reason: HOSPADM

## 2024-09-16 RX ORDER — SODIUM CHLORIDE 0.9 % (FLUSH) 0.9 %
5-40 SYRINGE (ML) INJECTION PRN
Status: CANCELLED | OUTPATIENT
Start: 2024-09-16

## 2024-09-16 RX ORDER — SODIUM CHLORIDE 9 MG/ML
5-250 INJECTION, SOLUTION INTRAVENOUS PRN
Status: CANCELLED | OUTPATIENT
Start: 2024-09-16

## 2024-09-16 RX ORDER — ACETAMINOPHEN 325 MG/1
650 TABLET ORAL
Status: CANCELLED | OUTPATIENT
Start: 2024-09-16

## 2024-09-16 RX ORDER — SODIUM CHLORIDE 0.9 % (FLUSH) 0.9 %
5-40 SYRINGE (ML) INJECTION PRN
Status: DISCONTINUED | OUTPATIENT
Start: 2024-09-16 | End: 2024-09-16 | Stop reason: HOSPADM

## 2024-09-16 RX ORDER — ONDANSETRON 2 MG/ML
8 INJECTION INTRAMUSCULAR; INTRAVENOUS
Status: DISCONTINUED | OUTPATIENT
Start: 2024-09-16 | End: 2024-09-16 | Stop reason: HOSPADM

## 2024-09-16 RX ORDER — ONDANSETRON 2 MG/ML
8 INJECTION INTRAMUSCULAR; INTRAVENOUS
Status: CANCELLED | OUTPATIENT
Start: 2024-09-16

## 2024-09-16 RX ORDER — HEPARIN 100 UNIT/ML
500 SYRINGE INTRAVENOUS PRN
Status: DISCONTINUED | OUTPATIENT
Start: 2024-09-16 | End: 2024-09-16 | Stop reason: HOSPADM

## 2024-09-16 RX ORDER — ACETAMINOPHEN 325 MG/1
650 TABLET ORAL
Status: DISCONTINUED | OUTPATIENT
Start: 2024-09-16 | End: 2024-09-16 | Stop reason: HOSPADM

## 2024-09-16 RX ORDER — ALBUTEROL SULFATE 90 UG/1
4 INHALANT RESPIRATORY (INHALATION) PRN
Status: CANCELLED | OUTPATIENT
Start: 2024-09-16

## 2024-09-16 RX ORDER — EPINEPHRINE 1 MG/ML
0.3 INJECTION, SOLUTION, CONCENTRATE INTRAVENOUS PRN
Status: DISCONTINUED | OUTPATIENT
Start: 2024-09-16 | End: 2024-09-16 | Stop reason: HOSPADM

## 2024-09-16 RX ORDER — SODIUM CHLORIDE 9 MG/ML
INJECTION, SOLUTION INTRAVENOUS CONTINUOUS
Status: DISCONTINUED | OUTPATIENT
Start: 2024-09-16 | End: 2024-09-16 | Stop reason: HOSPADM

## 2024-09-16 RX ORDER — DIPHENHYDRAMINE HYDROCHLORIDE 50 MG/ML
50 INJECTION INTRAMUSCULAR; INTRAVENOUS
Status: DISCONTINUED | OUTPATIENT
Start: 2024-09-16 | End: 2024-09-16 | Stop reason: HOSPADM

## 2024-09-16 RX ORDER — EPINEPHRINE 1 MG/ML
0.3 INJECTION, SOLUTION, CONCENTRATE INTRAVENOUS PRN
Status: CANCELLED | OUTPATIENT
Start: 2024-09-16

## 2024-09-16 RX ORDER — DIPHENHYDRAMINE HYDROCHLORIDE 50 MG/ML
50 INJECTION INTRAMUSCULAR; INTRAVENOUS
Status: CANCELLED | OUTPATIENT
Start: 2024-09-16

## 2024-09-16 RX ORDER — SODIUM CHLORIDE 9 MG/ML
INJECTION, SOLUTION INTRAVENOUS CONTINUOUS
Status: CANCELLED | OUTPATIENT
Start: 2024-09-16

## 2024-09-17 ENCOUNTER — TELEPHONE (OUTPATIENT)
Age: 79
End: 2024-09-17

## 2024-09-17 ENCOUNTER — PATIENT MESSAGE (OUTPATIENT)
Age: 79
End: 2024-09-17

## 2024-09-17 DIAGNOSIS — D64.9 ANEMIA, UNSPECIFIED TYPE: Primary | ICD-10-CM

## 2024-09-18 ENCOUNTER — NURSE ONLY (OUTPATIENT)
Age: 79
End: 2024-09-18

## 2024-09-18 VITALS
DIASTOLIC BLOOD PRESSURE: 68 MMHG | HEART RATE: 72 BPM | RESPIRATION RATE: 16 BRPM | OXYGEN SATURATION: 99 % | TEMPERATURE: 98.1 F | SYSTOLIC BLOOD PRESSURE: 138 MMHG

## 2024-09-18 DIAGNOSIS — D50.9 IRON DEFICIENCY ANEMIA, UNSPECIFIED IRON DEFICIENCY ANEMIA TYPE: Primary | ICD-10-CM

## 2024-09-18 RX ORDER — ONDANSETRON 2 MG/ML
8 INJECTION INTRAMUSCULAR; INTRAVENOUS
Status: DISCONTINUED | OUTPATIENT
Start: 2024-09-18 | End: 2024-09-18 | Stop reason: HOSPADM

## 2024-09-18 RX ORDER — ALBUTEROL SULFATE 90 UG/1
4 INHALANT RESPIRATORY (INHALATION) PRN
Status: DISCONTINUED | OUTPATIENT
Start: 2024-09-18 | End: 2024-09-18 | Stop reason: HOSPADM

## 2024-09-18 RX ORDER — ACETAMINOPHEN 325 MG/1
650 TABLET ORAL
Status: DISCONTINUED | OUTPATIENT
Start: 2024-09-18 | End: 2024-09-18 | Stop reason: HOSPADM

## 2024-09-18 RX ORDER — SODIUM CHLORIDE 0.9 % (FLUSH) 0.9 %
5-40 SYRINGE (ML) INJECTION PRN
Status: DISCONTINUED | OUTPATIENT
Start: 2024-09-18 | End: 2024-09-18 | Stop reason: HOSPADM

## 2024-09-18 RX ORDER — SODIUM CHLORIDE 9 MG/ML
INJECTION, SOLUTION INTRAVENOUS CONTINUOUS
Status: DISCONTINUED | OUTPATIENT
Start: 2024-09-18 | End: 2024-09-18 | Stop reason: HOSPADM

## 2024-09-18 RX ORDER — ALBUTEROL SULFATE 90 UG/1
4 INHALANT RESPIRATORY (INHALATION) PRN
OUTPATIENT
Start: 2024-09-18

## 2024-09-18 RX ORDER — ACETAMINOPHEN 325 MG/1
650 TABLET ORAL
OUTPATIENT
Start: 2024-09-18

## 2024-09-18 RX ORDER — DIPHENHYDRAMINE HYDROCHLORIDE 50 MG/ML
50 INJECTION INTRAMUSCULAR; INTRAVENOUS
Status: DISCONTINUED | OUTPATIENT
Start: 2024-09-18 | End: 2024-09-18 | Stop reason: HOSPADM

## 2024-09-18 RX ORDER — DIPHENHYDRAMINE HYDROCHLORIDE 50 MG/ML
50 INJECTION INTRAMUSCULAR; INTRAVENOUS
OUTPATIENT
Start: 2024-09-18

## 2024-09-18 RX ORDER — SODIUM CHLORIDE 0.9 % (FLUSH) 0.9 %
5-40 SYRINGE (ML) INJECTION PRN
OUTPATIENT
Start: 2024-09-18

## 2024-09-18 RX ORDER — SODIUM CHLORIDE 9 MG/ML
5-250 INJECTION, SOLUTION INTRAVENOUS PRN
OUTPATIENT
Start: 2024-09-18

## 2024-09-18 RX ORDER — HEPARIN 100 UNIT/ML
500 SYRINGE INTRAVENOUS PRN
OUTPATIENT
Start: 2024-09-18

## 2024-09-18 RX ORDER — SODIUM CHLORIDE 9 MG/ML
5-250 INJECTION, SOLUTION INTRAVENOUS PRN
Status: DISCONTINUED | OUTPATIENT
Start: 2024-09-18 | End: 2024-09-18 | Stop reason: HOSPADM

## 2024-09-18 RX ORDER — HEPARIN 100 UNIT/ML
500 SYRINGE INTRAVENOUS PRN
Status: DISCONTINUED | OUTPATIENT
Start: 2024-09-18 | End: 2024-09-18 | Stop reason: HOSPADM

## 2024-09-18 RX ORDER — EPINEPHRINE 1 MG/ML
0.3 INJECTION, SOLUTION, CONCENTRATE INTRAVENOUS PRN
Status: DISCONTINUED | OUTPATIENT
Start: 2024-09-18 | End: 2024-09-18 | Stop reason: HOSPADM

## 2024-09-18 RX ORDER — EPINEPHRINE 1 MG/ML
0.3 INJECTION, SOLUTION, CONCENTRATE INTRAVENOUS PRN
OUTPATIENT
Start: 2024-09-18

## 2024-09-18 RX ORDER — SODIUM CHLORIDE 9 MG/ML
INJECTION, SOLUTION INTRAVENOUS CONTINUOUS
OUTPATIENT
Start: 2024-09-18

## 2024-09-18 RX ORDER — ONDANSETRON 2 MG/ML
8 INJECTION INTRAMUSCULAR; INTRAVENOUS
OUTPATIENT
Start: 2024-09-18

## 2024-09-24 DIAGNOSIS — D64.9 ANEMIA, UNSPECIFIED TYPE: ICD-10-CM

## 2024-09-24 LAB
BASOPHILS # BLD: 0 K/UL (ref 0–0.2)
BASOPHILS NFR BLD: 1 % (ref 0–2)
DIFFERENTIAL METHOD BLD: ABNORMAL
EOSINOPHIL # BLD: 0.1 K/UL (ref 0–0.8)
EOSINOPHIL NFR BLD: 3 % (ref 0.5–7.8)
ERYTHROCYTE [DISTWIDTH] IN BLOOD BY AUTOMATED COUNT: 14.7 % (ref 11.9–14.6)
HCT VFR BLD AUTO: 27.8 % (ref 41.1–50.3)
HGB BLD-MCNC: 8.4 G/DL (ref 13.6–17.2)
IMM GRANULOCYTES # BLD AUTO: 0 K/UL (ref 0–0.5)
IMM GRANULOCYTES NFR BLD AUTO: 0 % (ref 0–5)
LYMPHOCYTES # BLD: 1 K/UL (ref 0.5–4.6)
LYMPHOCYTES NFR BLD: 25 % (ref 13–44)
MCH RBC QN AUTO: 32.7 PG (ref 26.1–32.9)
MCHC RBC AUTO-ENTMCNC: 30.2 G/DL (ref 31.4–35)
MCV RBC AUTO: 108.2 FL (ref 82–102)
MONOCYTES # BLD: 0.5 K/UL (ref 0.1–1.3)
MONOCYTES NFR BLD: 13 % (ref 4–12)
NEUTS SEG # BLD: 2.4 K/UL (ref 1.7–8.2)
NEUTS SEG NFR BLD: 59 % (ref 43–78)
NRBC # BLD: 0 K/UL (ref 0–0.2)
PLATELET # BLD AUTO: 177 K/UL (ref 150–450)
PMV BLD AUTO: 10.7 FL (ref 9.4–12.3)
RBC # BLD AUTO: 2.57 M/UL (ref 4.23–5.6)
WBC # BLD AUTO: 4.1 K/UL (ref 4.3–11.1)

## 2024-09-25 NOTE — RESULT ENCOUNTER NOTE
Your most recent lab work shows no major change in her hemoglobin compared to prior studies.    Please let me know if you have additional questions or concerns.    Armaan Lyons MD

## 2024-10-02 ENCOUNTER — HOSPITAL ENCOUNTER (OUTPATIENT)
Dept: LAB | Age: 79
Discharge: HOME | End: 2024-10-02
Payer: MEDICARE

## 2024-10-02 ENCOUNTER — OFFICE VISIT (OUTPATIENT)
Dept: ONCOLOGY | Age: 79
End: 2024-10-02
Payer: MEDICARE

## 2024-10-02 VITALS
SYSTOLIC BLOOD PRESSURE: 132 MMHG | TEMPERATURE: 97.7 F | DIASTOLIC BLOOD PRESSURE: 55 MMHG | HEIGHT: 67 IN | OXYGEN SATURATION: 96 % | RESPIRATION RATE: 16 BRPM | HEART RATE: 62 BPM | BODY MASS INDEX: 28.72 KG/M2 | WEIGHT: 183 LBS

## 2024-10-02 DIAGNOSIS — D50.9 IRON DEFICIENCY ANEMIA, UNSPECIFIED IRON DEFICIENCY ANEMIA TYPE: ICD-10-CM

## 2024-10-02 DIAGNOSIS — R53.83 FATIGUE, UNSPECIFIED TYPE: ICD-10-CM

## 2024-10-02 DIAGNOSIS — D50.9 IRON DEFICIENCY ANEMIA, UNSPECIFIED IRON DEFICIENCY ANEMIA TYPE: Primary | ICD-10-CM

## 2024-10-02 DIAGNOSIS — D75.89 MACROCYTOSIS: ICD-10-CM

## 2024-10-02 LAB
ALBUMIN SERPL-MCNC: 3.8 G/DL (ref 3.2–4.6)
ALBUMIN/GLOB SERPL: 1.4 (ref 1–1.9)
ALP SERPL-CCNC: 77 U/L (ref 40–129)
ALT SERPL-CCNC: 33 U/L (ref 8–55)
ANION GAP SERPL CALC-SCNC: 10 MMOL/L (ref 9–18)
AST SERPL-CCNC: 31 U/L (ref 15–37)
BASOPHILS # BLD: 0 K/UL (ref 0–0.2)
BASOPHILS NFR BLD: 1 % (ref 0–2)
BILIRUB SERPL-MCNC: 0.2 MG/DL (ref 0–1.2)
BUN SERPL-MCNC: 29 MG/DL (ref 8–23)
CALCIUM SERPL-MCNC: 8.6 MG/DL (ref 8.8–10.2)
CHLORIDE SERPL-SCNC: 104 MMOL/L (ref 98–107)
CO2 SERPL-SCNC: 24 MMOL/L (ref 20–28)
CREAT SERPL-MCNC: 1.31 MG/DL (ref 0.8–1.3)
DIFFERENTIAL METHOD BLD: ABNORMAL
EOSINOPHIL # BLD: 0.1 K/UL (ref 0–0.8)
EOSINOPHIL NFR BLD: 2 % (ref 0.5–7.8)
ERYTHROCYTE [DISTWIDTH] IN BLOOD BY AUTOMATED COUNT: 14.2 % (ref 11.9–14.6)
FERRITIN SERPL-MCNC: 133 NG/ML (ref 8–388)
GLOBULIN SER CALC-MCNC: 2.8 G/DL (ref 2.3–3.5)
GLUCOSE SERPL-MCNC: 137 MG/DL (ref 70–99)
HCT VFR BLD AUTO: 28.3 % (ref 41.1–50.3)
HGB BLD-MCNC: 8.8 G/DL (ref 13.6–17.2)
IMM GRANULOCYTES # BLD AUTO: 0 K/UL (ref 0–0.5)
IMM GRANULOCYTES NFR BLD AUTO: 0 % (ref 0–5)
IRON SATN MFR SERPL: 19 % (ref 20–50)
IRON SERPL-MCNC: 56 UG/DL (ref 35–100)
LDH SERPL L TO P-CCNC: 184 U/L (ref 127–281)
LYMPHOCYTES # BLD: 1.3 K/UL (ref 0.5–4.6)
LYMPHOCYTES NFR BLD: 26 % (ref 13–44)
MCH RBC QN AUTO: 32.6 PG (ref 26.1–32.9)
MCHC RBC AUTO-ENTMCNC: 31.1 G/DL (ref 31.4–35)
MCV RBC AUTO: 104.8 FL (ref 82–102)
MONOCYTES # BLD: 0.5 K/UL (ref 0.1–1.3)
MONOCYTES NFR BLD: 11 % (ref 4–12)
NEUTS SEG # BLD: 2.9 K/UL (ref 1.7–8.2)
NEUTS SEG NFR BLD: 60 % (ref 43–78)
NRBC # BLD: 0 K/UL (ref 0–0.2)
PLATELET # BLD AUTO: 162 K/UL (ref 150–450)
PMV BLD AUTO: 10 FL (ref 9.4–12.3)
POTASSIUM SERPL-SCNC: 4.3 MMOL/L (ref 3.5–5.1)
PROT SERPL-MCNC: 6.6 G/DL (ref 6.3–8.2)
RBC # BLD AUTO: 2.7 M/UL (ref 4.23–5.6)
SODIUM SERPL-SCNC: 138 MMOL/L (ref 136–145)
TIBC SERPL-MCNC: 299 UG/DL (ref 240–450)
UIBC SERPL-MCNC: 243 UG/DL (ref 112–347)
VIT B12 SERPL-MCNC: 559 PG/ML (ref 193–986)
WBC # BLD AUTO: 4.8 K/UL (ref 4.3–11.1)

## 2024-10-02 PROCEDURE — 1036F TOBACCO NON-USER: CPT | Performed by: INTERNAL MEDICINE

## 2024-10-02 PROCEDURE — 83540 ASSAY OF IRON: CPT

## 2024-10-02 PROCEDURE — 3075F SYST BP GE 130 - 139MM HG: CPT | Performed by: INTERNAL MEDICINE

## 2024-10-02 PROCEDURE — 3078F DIAST BP <80 MM HG: CPT | Performed by: INTERNAL MEDICINE

## 2024-10-02 PROCEDURE — 83615 LACTATE (LD) (LDH) ENZYME: CPT

## 2024-10-02 PROCEDURE — 99204 OFFICE O/P NEW MOD 45 MIN: CPT | Performed by: INTERNAL MEDICINE

## 2024-10-02 PROCEDURE — 82607 VITAMIN B-12: CPT

## 2024-10-02 PROCEDURE — 85025 COMPLETE CBC W/AUTO DIFF WBC: CPT

## 2024-10-02 PROCEDURE — 82728 ASSAY OF FERRITIN: CPT

## 2024-10-02 PROCEDURE — G8484 FLU IMMUNIZE NO ADMIN: HCPCS | Performed by: INTERNAL MEDICINE

## 2024-10-02 PROCEDURE — 36415 COLL VENOUS BLD VENIPUNCTURE: CPT

## 2024-10-02 PROCEDURE — G8417 CALC BMI ABV UP PARAM F/U: HCPCS | Performed by: INTERNAL MEDICINE

## 2024-10-02 PROCEDURE — 1123F ACP DISCUSS/DSCN MKR DOCD: CPT | Performed by: INTERNAL MEDICINE

## 2024-10-02 PROCEDURE — G8427 DOCREV CUR MEDS BY ELIG CLIN: HCPCS | Performed by: INTERNAL MEDICINE

## 2024-10-02 PROCEDURE — 83550 IRON BINDING TEST: CPT

## 2024-10-02 PROCEDURE — 80053 COMPREHEN METABOLIC PANEL: CPT

## 2024-10-02 RX ORDER — ACETAMINOPHEN 325 MG/1
650 TABLET ORAL
OUTPATIENT
Start: 2024-10-07

## 2024-10-02 RX ORDER — DIPHENHYDRAMINE HYDROCHLORIDE 50 MG/ML
50 INJECTION INTRAMUSCULAR; INTRAVENOUS
OUTPATIENT
Start: 2024-10-07

## 2024-10-02 RX ORDER — SODIUM CHLORIDE 9 MG/ML
5-250 INJECTION, SOLUTION INTRAVENOUS PRN
OUTPATIENT
Start: 2024-10-07

## 2024-10-02 RX ORDER — ALBUTEROL SULFATE 90 UG/1
4 INHALANT RESPIRATORY (INHALATION) PRN
OUTPATIENT
Start: 2024-10-07

## 2024-10-02 RX ORDER — SODIUM CHLORIDE 9 MG/ML
INJECTION, SOLUTION INTRAVENOUS CONTINUOUS
OUTPATIENT
Start: 2024-10-07

## 2024-10-02 RX ORDER — SODIUM CHLORIDE 0.9 % (FLUSH) 0.9 %
5-40 SYRINGE (ML) INJECTION PRN
OUTPATIENT
Start: 2024-10-07

## 2024-10-02 RX ORDER — ONDANSETRON 2 MG/ML
8 INJECTION INTRAMUSCULAR; INTRAVENOUS
OUTPATIENT
Start: 2024-10-07

## 2024-10-02 RX ORDER — FAMOTIDINE 10 MG/ML
20 INJECTION, SOLUTION INTRAVENOUS
OUTPATIENT
Start: 2024-10-07

## 2024-10-02 RX ORDER — EPINEPHRINE 1 MG/ML
0.3 INJECTION, SOLUTION, CONCENTRATE INTRAVENOUS PRN
OUTPATIENT
Start: 2024-10-07

## 2024-10-02 RX ORDER — HEPARIN SODIUM (PORCINE) LOCK FLUSH IV SOLN 100 UNIT/ML 100 UNIT/ML
500 SOLUTION INTRAVENOUS PRN
OUTPATIENT
Start: 2024-10-07

## 2024-10-02 ASSESSMENT — PATIENT HEALTH QUESTIONNAIRE - PHQ9
SUM OF ALL RESPONSES TO PHQ QUESTIONS 1-9: 0
SUM OF ALL RESPONSES TO PHQ9 QUESTIONS 1 & 2: 0
SUM OF ALL RESPONSES TO PHQ QUESTIONS 1-9: 0
1. LITTLE INTEREST OR PLEASURE IN DOING THINGS: NOT AT ALL
SUM OF ALL RESPONSES TO PHQ QUESTIONS 1-9: 0
2. FEELING DOWN, DEPRESSED OR HOPELESS: NOT AT ALL
SUM OF ALL RESPONSES TO PHQ QUESTIONS 1-9: 0

## 2024-10-02 NOTE — PATIENT INSTRUCTIONS
- 8.2 g/dL Final    Albumin 10/02/2024 3.8  3.2 - 4.6 g/dL Final    Globulin 10/02/2024 2.8  2.3 - 3.5 g/dL Final    Albumin/Globulin Ratio 10/02/2024 1.4  1.0 - 1.9   Final                 Please refer to After Visit Summary or [facility] [employer] for upcoming appointment information. Please call our office for rescheduling needs at least 24 hours before your scheduled appointment time.If you have any questions regarding your upcoming schedule please reach out to your care team through [employer] or call [phone].     Please notify your assigned Nurse Navigator of any unplanned hospital admissions or Emergency Room visits within 24 hours of discharge.    -------------------------------------------------------------------------------------------------------------------  Please call our office at [phone] if you have any  of the following symptoms:   Fever of 100.5 or greater  Chills  Shortness of breath  Swelling or pain in one leg    After office hours an answering service is available and will contact a provider for emergencies or if you are experiencing any of the above symptoms.  JAQUELINE FLOOD RN

## 2024-10-02 NOTE — PROGRESS NOTES
NEW PATIENT INTAKE    Referral Diagnosis: Anemia, unspecified type      Referring Provider: Armaan Lyons MD    Primary Care Provider: Sharan Aguilar DO     Family History of Cancer/ Hematology Disorders: No known family history    Presenting Symptoms: Anemia, unspecified type    Chronological History of Pertinent Events:     77yo white male    PMH: DM II, ZAINAB, PAF, CKD3a, Dyslipidemia, Essential HTN, BPH, Vit D deficiency, Class 1 Obesity    9/4/24 - Met with Cardiologist, Armaan Lyons MD ([facility])  Here for f/u for HTN, HLD, and to review ECHO results  History of Present Illness  Patient presents with SOB and he was found to have diminished Hgb. Iron infusion therapies are recommended additionally we recommended following up with GI.  Interval History:  6/23/2022 previous history of coronary artery disease status post coronary artery bypass grafting in 1985 PCI in 2014.  Additional history of paroxysmal atrial fibrillation.  Carotid studies performed in the past with mildly elevated velocities.       Cardiac History:  1. Coronary artery bypass grafting in 1985  2. PCI 2014  3. 2018 cardioversion  4. Echocardiogram 5/2020  5. 1/2022 echocardiogram [facility] ejection fraction 55 to 65% mild aortic stenosis.  Grade 2 diastolic dysfunction  6. 6/28/2023 EKG: sinus rhythm, normal rate, normal WI intervals, nonspecific ST abnormality  7. 2/22/2024 EKG sinus bradycardia with rate variation  8. 8/19/2024 EKG sinus bradycardia PACs.  9. 8/22/2024 echocardiogram ejection fraction 79% moderate aortic stenosis with mean gradient 29 mmHg moderate mitral regurgitation    Abnormal labs (9/4/24 - [facility])  WBC - 4.1  RBC - 2.83  Hgb - 9.0  Hct - 30.0  MCV - 106.0  MCHC - 30.0  Mono % - 13  Assessment:  1. Anemia - Plan for GI workup iron infusion therapy.  Possible candidate for Watchman due to history of atrial fibrillation.  Previous surgical details unknown however transesophageal echocardiogram was performed in November 2023

## 2024-10-02 NOTE — PROGRESS NOTES
No thyromegaly present.    Lymph node No palpable submandibular, cervical, supraclavicular, axillary and inguinal lymph nodes.   Skin Warm and dry.  No bruising and no rash noted.  No erythema.  No pallor.    Respiratory Effort normal and breath sounds normal.  No respiratory distress.  No wheezes.  No rales.  No tenderness.    CVS Normal rate, regular rhythm and normal heart sounds.  Exam reveals no gallop, no friction and no rub.  No murmur heard.   Abdomen Soft. Bowel sounds are normal. Exhibits no distension. There is no tenderness. There is no rebound and no guarding.   Neuro Normal reflexes.  No cranial nerve deficit.  Exhibits normal muscle tone, 5 of 5 strength of all extremities.   MSK Normal range of motion in general.  No edema and no tenderness.   Psych Normal mood, affect, behavior, judgment and thought content      Labs:  Recent Results (from the past 24 hour(s))   Ferritin    Collection Time: 10/02/24  1:04 PM   Result Value Ref Range    Ferritin 133 8 - 388 NG/ML   Lactate Dehydrogenase    Collection Time: 10/02/24  1:04 PM   Result Value Ref Range     127 - 281 U/L   Iron and TIBC    Collection Time: 10/02/24  1:04 PM   Result Value Ref Range    Iron 56 35 - 100 ug/dL    TIBC 299 240 - 450 ug/dL    Iron % Saturation 19 (L) 20 - 50 %    UIBC 243.0 112.0 - 347.0 ug/dL   Vitamin B12    Collection Time: 10/02/24  1:04 PM   Result Value Ref Range    Vitamin B-12 559 193 - 986 pg/mL   CBC with Auto Differential    Collection Time: 10/02/24  1:04 PM   Result Value Ref Range    WBC 4.8 4.3 - 11.1 K/uL    RBC 2.70 (L) 4.23 - 5.6 M/uL    Hemoglobin 8.8 (L) 13.6 - 17.2 g/dL    Hematocrit 28.3 (L) 41.1 - 50.3 %    .8 (H) 82.0 - 102.0 FL    MCH 32.6 26.1 - 32.9 PG    MCHC 31.1 (L) 31.4 - 35.0 g/dL    RDW 14.2 11.9 - 14.6 %    Platelets 162 150 - 450 K/uL    MPV 10.0 9.4 - 12.3 FL    nRBC 0.00 0.0 - 0.2 K/uL    Neutrophils % 60 43 - 78 %    Lymphocytes % 26 13 - 44 %    Monocytes % 11 4.0 - 12.0 %

## 2024-10-08 ENCOUNTER — HOSPITAL ENCOUNTER (OUTPATIENT)
Dept: INFUSION THERAPY | Age: 79
Setting detail: INFUSION SERIES
Discharge: HOME | End: 2024-10-08
Payer: MEDICARE

## 2024-10-08 VITALS
TEMPERATURE: 97.6 F | HEART RATE: 61 BPM | SYSTOLIC BLOOD PRESSURE: 125 MMHG | DIASTOLIC BLOOD PRESSURE: 49 MMHG | OXYGEN SATURATION: 98 % | RESPIRATION RATE: 16 BRPM

## 2024-10-08 DIAGNOSIS — D50.9 IRON DEFICIENCY ANEMIA, UNSPECIFIED IRON DEFICIENCY ANEMIA TYPE: Primary | ICD-10-CM

## 2024-10-08 PROCEDURE — 6360000002 HC RX W HCPCS: Performed by: INTERNAL MEDICINE

## 2024-10-08 PROCEDURE — 96365 THER/PROPH/DIAG IV INF INIT: CPT

## 2024-10-08 PROCEDURE — 2580000003 HC RX 258: Performed by: INTERNAL MEDICINE

## 2024-10-08 RX ORDER — SODIUM CHLORIDE 9 MG/ML
INJECTION, SOLUTION INTRAVENOUS CONTINUOUS
OUTPATIENT
Start: 2024-10-15

## 2024-10-08 RX ORDER — SODIUM CHLORIDE 0.9 % (FLUSH) 0.9 %
5-40 SYRINGE (ML) INJECTION PRN
Status: DISCONTINUED | OUTPATIENT
Start: 2024-10-08 | End: 2024-10-09 | Stop reason: HOSPADM

## 2024-10-08 RX ORDER — SODIUM CHLORIDE 9 MG/ML
5-250 INJECTION, SOLUTION INTRAVENOUS PRN
OUTPATIENT
Start: 2024-10-15

## 2024-10-08 RX ORDER — ALBUTEROL SULFATE 90 UG/1
4 INHALANT RESPIRATORY (INHALATION) PRN
Status: CANCELLED | OUTPATIENT
Start: 2024-10-15

## 2024-10-08 RX ORDER — HEPARIN 100 UNIT/ML
500 SYRINGE INTRAVENOUS PRN
OUTPATIENT
Start: 2024-10-15

## 2024-10-08 RX ORDER — SODIUM CHLORIDE 9 MG/ML
5-250 INJECTION, SOLUTION INTRAVENOUS PRN
Status: DISCONTINUED | OUTPATIENT
Start: 2024-10-08 | End: 2024-10-09 | Stop reason: HOSPADM

## 2024-10-08 RX ORDER — ACETAMINOPHEN 325 MG/1
650 TABLET ORAL
Status: CANCELLED | OUTPATIENT
Start: 2024-10-15

## 2024-10-08 RX ORDER — ONDANSETRON 2 MG/ML
8 INJECTION INTRAMUSCULAR; INTRAVENOUS
Status: CANCELLED | OUTPATIENT
Start: 2024-10-15

## 2024-10-08 RX ORDER — EPINEPHRINE 1 MG/ML
0.3 INJECTION, SOLUTION, CONCENTRATE INTRAVENOUS PRN
Status: CANCELLED | OUTPATIENT
Start: 2024-10-15

## 2024-10-08 RX ORDER — DIPHENHYDRAMINE HYDROCHLORIDE 50 MG/ML
50 INJECTION INTRAMUSCULAR; INTRAVENOUS
Status: CANCELLED | OUTPATIENT
Start: 2024-10-15

## 2024-10-08 RX ORDER — SODIUM CHLORIDE 0.9 % (FLUSH) 0.9 %
5-40 SYRINGE (ML) INJECTION PRN
Status: CANCELLED | OUTPATIENT
Start: 2024-10-15

## 2024-10-08 RX ADMIN — SODIUM CHLORIDE, PRESERVATIVE FREE 10 ML: 5 INJECTION INTRAVENOUS at 14:40

## 2024-10-08 RX ADMIN — FERUMOXYTOL 510 MG: 510 INJECTION INTRAVENOUS at 14:48

## 2024-10-08 NOTE — PROGRESS NOTES
Arrived to the Infusion Center.  Feraheme infusion completed. Patient tolerated well.   Any issues or concerns during appointment: no.  Patient aware of next infusion appointment on 10/17/2024 (date) at 1315 (time).  Patient aware of next lab and [facility] office visit on 1/6/2025 (date) at 1430 (time).  Patient instructed to call provider with temperature of 100.4 or greater or nausea/vomiting/ diarrhea or pain not controlled by medications  Discharged ambulatory.

## 2024-10-15 ENCOUNTER — APPOINTMENT (OUTPATIENT)
Dept: INFUSION THERAPY | Age: 79
End: 2024-10-15
Payer: MEDICARE

## 2024-10-17 ENCOUNTER — HOSPITAL ENCOUNTER (OUTPATIENT)
Dept: INFUSION THERAPY | Age: 79
Setting detail: INFUSION SERIES
Discharge: HOME | End: 2024-10-17
Payer: MEDICARE

## 2024-10-17 VITALS
DIASTOLIC BLOOD PRESSURE: 52 MMHG | TEMPERATURE: 98.6 F | SYSTOLIC BLOOD PRESSURE: 131 MMHG | RESPIRATION RATE: 16 BRPM | OXYGEN SATURATION: 100 % | HEART RATE: 53 BPM

## 2024-10-17 DIAGNOSIS — D50.9 IRON DEFICIENCY ANEMIA, UNSPECIFIED IRON DEFICIENCY ANEMIA TYPE: Primary | ICD-10-CM

## 2024-10-17 PROCEDURE — 96365 THER/PROPH/DIAG IV INF INIT: CPT

## 2024-10-17 PROCEDURE — 2580000003 HC RX 258: Performed by: INTERNAL MEDICINE

## 2024-10-17 PROCEDURE — 6360000002 HC RX W HCPCS: Performed by: INTERNAL MEDICINE

## 2024-10-17 RX ORDER — SODIUM CHLORIDE 0.9 % (FLUSH) 0.9 %
5-40 SYRINGE (ML) INJECTION PRN
OUTPATIENT
Start: 2024-10-22

## 2024-10-17 RX ORDER — SODIUM CHLORIDE 9 MG/ML
5-250 INJECTION, SOLUTION INTRAVENOUS PRN
OUTPATIENT
Start: 2024-10-22

## 2024-10-17 RX ORDER — ONDANSETRON 2 MG/ML
8 INJECTION INTRAMUSCULAR; INTRAVENOUS
Status: DISCONTINUED | OUTPATIENT
Start: 2024-10-17 | End: 2024-10-18 | Stop reason: HOSPADM

## 2024-10-17 RX ORDER — SODIUM CHLORIDE 9 MG/ML
5-250 INJECTION, SOLUTION INTRAVENOUS PRN
OUTPATIENT
Start: 2024-10-17

## 2024-10-17 RX ORDER — SODIUM CHLORIDE 0.9 % (FLUSH) 0.9 %
5-40 SYRINGE (ML) INJECTION PRN
OUTPATIENT
Start: 2024-10-17

## 2024-10-17 RX ORDER — ACETAMINOPHEN 325 MG/1
650 TABLET ORAL
Status: DISCONTINUED | OUTPATIENT
Start: 2024-10-17 | End: 2024-10-18 | Stop reason: HOSPADM

## 2024-10-17 RX ORDER — SODIUM CHLORIDE 0.9 % (FLUSH) 0.9 %
5-40 SYRINGE (ML) INJECTION PRN
Status: DISCONTINUED | OUTPATIENT
Start: 2024-10-17 | End: 2024-10-18 | Stop reason: HOSPADM

## 2024-10-17 RX ORDER — HEPARIN 100 UNIT/ML
500 SYRINGE INTRAVENOUS PRN
OUTPATIENT
Start: 2024-10-22

## 2024-10-17 RX ORDER — EPINEPHRINE 1 MG/ML
0.3 INJECTION, SOLUTION, CONCENTRATE INTRAVENOUS PRN
OUTPATIENT
Start: 2024-10-22

## 2024-10-17 RX ORDER — SODIUM CHLORIDE 9 MG/ML
INJECTION, SOLUTION INTRAVENOUS CONTINUOUS
OUTPATIENT
Start: 2024-10-17

## 2024-10-17 RX ORDER — HEPARIN 100 UNIT/ML
500 SYRINGE INTRAVENOUS PRN
OUTPATIENT
Start: 2024-10-17

## 2024-10-17 RX ORDER — ALBUTEROL SULFATE 90 UG/1
4 INHALANT RESPIRATORY (INHALATION) PRN
OUTPATIENT
Start: 2024-10-22

## 2024-10-17 RX ORDER — EPINEPHRINE 1 MG/ML
0.3 INJECTION, SOLUTION, CONCENTRATE INTRAVENOUS PRN
OUTPATIENT
Start: 2024-10-17

## 2024-10-17 RX ORDER — ALBUTEROL SULFATE 90 UG/1
4 INHALANT RESPIRATORY (INHALATION) PRN
Status: DISCONTINUED | OUTPATIENT
Start: 2024-10-17 | End: 2024-10-18 | Stop reason: HOSPADM

## 2024-10-17 RX ORDER — DIPHENHYDRAMINE HYDROCHLORIDE 50 MG/ML
50 INJECTION INTRAMUSCULAR; INTRAVENOUS
OUTPATIENT
Start: 2024-10-22

## 2024-10-17 RX ORDER — EPINEPHRINE 1 MG/ML
0.3 INJECTION, SOLUTION, CONCENTRATE INTRAVENOUS PRN
Status: DISCONTINUED | OUTPATIENT
Start: 2024-10-17 | End: 2024-10-18 | Stop reason: HOSPADM

## 2024-10-17 RX ORDER — ONDANSETRON 2 MG/ML
8 INJECTION INTRAMUSCULAR; INTRAVENOUS
OUTPATIENT
Start: 2024-10-17

## 2024-10-17 RX ORDER — ACETAMINOPHEN 325 MG/1
650 TABLET ORAL
OUTPATIENT
Start: 2024-10-17

## 2024-10-17 RX ORDER — ALBUTEROL SULFATE 90 UG/1
4 INHALANT RESPIRATORY (INHALATION) PRN
OUTPATIENT
Start: 2024-10-17

## 2024-10-17 RX ORDER — ONDANSETRON 2 MG/ML
8 INJECTION INTRAMUSCULAR; INTRAVENOUS
OUTPATIENT
Start: 2024-10-22

## 2024-10-17 RX ORDER — DIPHENHYDRAMINE HYDROCHLORIDE 50 MG/ML
50 INJECTION INTRAMUSCULAR; INTRAVENOUS
Status: DISCONTINUED | OUTPATIENT
Start: 2024-10-17 | End: 2024-10-18 | Stop reason: HOSPADM

## 2024-10-17 RX ORDER — SODIUM CHLORIDE 9 MG/ML
INJECTION, SOLUTION INTRAVENOUS CONTINUOUS
OUTPATIENT
Start: 2024-10-22

## 2024-10-17 RX ORDER — DIPHENHYDRAMINE HYDROCHLORIDE 50 MG/ML
50 INJECTION INTRAMUSCULAR; INTRAVENOUS
OUTPATIENT
Start: 2024-10-17

## 2024-10-17 RX ORDER — ACETAMINOPHEN 325 MG/1
650 TABLET ORAL
OUTPATIENT
Start: 2024-10-22

## 2024-10-17 RX ADMIN — SODIUM CHLORIDE, PRESERVATIVE FREE 10 ML: 5 INJECTION INTRAVENOUS at 13:41

## 2024-10-17 RX ADMIN — FERUMOXYTOL 510 MG: 510 INJECTION INTRAVENOUS at 14:03

## 2024-10-17 NOTE — PROGRESS NOTES
Arrived to the Infusion Center.  Feraheme iron infusion completed. Patient tolerated well.   Any issues or concerns during appointment: none.  Patient aware of next lab and [facility] office visit on 1/6/25 (date) at 2:30pm (time).  Patient instructed to call provider with temperature of 100.4 or greater or nausea/vomiting/ diarrhea or pain not controlled by medications  Discharged home ambulatory after observation period.

## 2024-10-24 ENCOUNTER — OFFICE VISIT (OUTPATIENT)
Age: 79
End: 2024-10-24
Payer: MEDICARE

## 2024-10-24 VITALS
SYSTOLIC BLOOD PRESSURE: 120 MMHG | BODY MASS INDEX: 28.38 KG/M2 | HEART RATE: 62 BPM | HEIGHT: 67 IN | WEIGHT: 180.8 LBS | DIASTOLIC BLOOD PRESSURE: 58 MMHG

## 2024-10-24 DIAGNOSIS — I48.0 PAROXYSMAL ATRIAL FIBRILLATION (HCC): ICD-10-CM

## 2024-10-24 DIAGNOSIS — I25.10 CORONARY ARTERY DISEASE INVOLVING NATIVE CORONARY ARTERY OF NATIVE HEART WITHOUT ANGINA PECTORIS: ICD-10-CM

## 2024-10-24 DIAGNOSIS — D50.9 IRON DEFICIENCY ANEMIA, UNSPECIFIED IRON DEFICIENCY ANEMIA TYPE: Primary | ICD-10-CM

## 2024-10-24 DIAGNOSIS — D50.9 IRON DEFICIENCY ANEMIA, UNSPECIFIED IRON DEFICIENCY ANEMIA TYPE: ICD-10-CM

## 2024-10-24 LAB
ERYTHROCYTE [DISTWIDTH] IN BLOOD BY AUTOMATED COUNT: 13.7 % (ref 11.9–14.6)
HCT VFR BLD AUTO: 32.4 % (ref 41.1–50.3)
HGB BLD-MCNC: 10.1 G/DL (ref 13.6–17.2)
MCH RBC QN AUTO: 32.6 PG (ref 26.1–32.9)
MCHC RBC AUTO-ENTMCNC: 31.2 G/DL (ref 31.4–35)
MCV RBC AUTO: 104.5 FL (ref 82–102)
NRBC # BLD: 0 K/UL (ref 0–0.2)
PLATELET # BLD AUTO: 150 K/UL (ref 150–450)
PMV BLD AUTO: 10.4 FL (ref 9.4–12.3)
RBC # BLD AUTO: 3.1 M/UL (ref 4.23–5.6)
WBC # BLD AUTO: 3.6 K/UL (ref 4.3–11.1)

## 2024-10-24 PROCEDURE — 3078F DIAST BP <80 MM HG: CPT | Performed by: INTERNAL MEDICINE

## 2024-10-24 PROCEDURE — G8417 CALC BMI ABV UP PARAM F/U: HCPCS | Performed by: INTERNAL MEDICINE

## 2024-10-24 PROCEDURE — 1123F ACP DISCUSS/DSCN MKR DOCD: CPT | Performed by: INTERNAL MEDICINE

## 2024-10-24 PROCEDURE — 99214 OFFICE O/P EST MOD 30 MIN: CPT | Performed by: INTERNAL MEDICINE

## 2024-10-24 PROCEDURE — G8484 FLU IMMUNIZE NO ADMIN: HCPCS | Performed by: INTERNAL MEDICINE

## 2024-10-24 PROCEDURE — 1036F TOBACCO NON-USER: CPT | Performed by: INTERNAL MEDICINE

## 2024-10-24 PROCEDURE — 1159F MED LIST DOCD IN RCRD: CPT | Performed by: INTERNAL MEDICINE

## 2024-10-24 PROCEDURE — 3074F SYST BP LT 130 MM HG: CPT | Performed by: INTERNAL MEDICINE

## 2024-10-24 PROCEDURE — 1126F AMNT PAIN NOTED NONE PRSNT: CPT | Performed by: INTERNAL MEDICINE

## 2024-10-24 PROCEDURE — G8427 DOCREV CUR MEDS BY ELIG CLIN: HCPCS | Performed by: INTERNAL MEDICINE

## 2024-10-24 RX ORDER — FUROSEMIDE 20 MG/1
20 TABLET ORAL 2 TIMES DAILY
COMMUNITY

## 2024-10-24 RX ORDER — CELECOXIB 200 MG/1
200 CAPSULE ORAL 2 TIMES DAILY
COMMUNITY

## 2024-10-24 RX ORDER — OMEPRAZOLE 40 MG/1
40 CAPSULE, DELAYED RELEASE ORAL DAILY
COMMUNITY

## 2024-10-24 NOTE — PROGRESS NOTES
significantly outweigh risk. All questions answered to the best of my ability.  Patient would like to proceed with evaluation.  Will arrange TIM for screening.         3. Coronary artery disease involving native coronary artery of native heart without angina pectoris  Patient is doing well without any anginal symptoms.   We discussed the importance of continued risk factor modification.  The patient is encouraged to contact my office with any worsening dyspnea or chest discomfort.              Thank you for allowing me to participate in this patient's care.  Please call or contact me if there are any questions or concerns regarding the above.      Ayo Martin MD  10/28/24  8:33 PM    This note may have been dictated using speech recognition software.  As a result, error of speech recognition may have occurred

## 2024-10-28 PROBLEM — I48.0 PAROXYSMAL ATRIAL FIBRILLATION (HCC): Status: ACTIVE | Noted: 2023-11-07

## 2024-10-28 ASSESSMENT — ENCOUNTER SYMPTOMS
LEFT EYE: 0
SHORTNESS OF BREATH: 0
COLOR CHANGE: 0
DIARRHEA: 0

## 2024-10-29 ENCOUNTER — TELEPHONE (OUTPATIENT)
Age: 79
End: 2024-10-29

## 2024-10-29 NOTE — TELEPHONE ENCOUNTER
Patient called with Dr Martin's response. Patient thanked me//zeina----- Message from Dr. Ayo Martin MD sent at 10/28/2024  8:41 PM EDT -----  Please call patient.  His hemoglobin has improved to 10.1.  Let him know I have reached out to his hematologist and Dr. Lyons and I will be in touch when I hear back from them in regards to proceeding with watchman and anticoagulation  Thank you

## 2024-10-29 NOTE — RESULT ENCOUNTER NOTE
Please call patient.  His hemoglobin has improved to 10.1.  Let him know I have reached out to his hematologist and Dr. Lyons and I will be in touch when I hear back from them in regards to proceeding with watchman and anticoagulation  Thank you

## 2024-11-04 ENCOUNTER — TELEPHONE (OUTPATIENT)
Dept: CARDIAC CATH/INVASIVE PROCEDURES | Age: 79
End: 2024-11-04

## 2024-11-04 ENCOUNTER — PATIENT MESSAGE (OUTPATIENT)
Age: 79
End: 2024-11-04

## 2024-11-04 ENCOUNTER — OFFICE VISIT (OUTPATIENT)
Age: 79
End: 2024-11-04

## 2024-11-04 VITALS
BODY MASS INDEX: 29.35 KG/M2 | WEIGHT: 187 LBS | SYSTOLIC BLOOD PRESSURE: 122 MMHG | DIASTOLIC BLOOD PRESSURE: 62 MMHG | HEART RATE: 58 BPM | HEIGHT: 67 IN

## 2024-11-04 DIAGNOSIS — I48.0 PAROXYSMAL ATRIAL FIBRILLATION (HCC): ICD-10-CM

## 2024-11-04 DIAGNOSIS — I35.0 NONRHEUMATIC AORTIC VALVE STENOSIS: ICD-10-CM

## 2024-11-04 DIAGNOSIS — D50.9 IRON DEFICIENCY ANEMIA, UNSPECIFIED IRON DEFICIENCY ANEMIA TYPE: Primary | ICD-10-CM

## 2024-11-04 DIAGNOSIS — E78.5 HYPERLIPIDEMIA LDL GOAL <70: ICD-10-CM

## 2024-11-04 DIAGNOSIS — I25.10 ASCVD (ARTERIOSCLEROTIC CARDIOVASCULAR DISEASE): ICD-10-CM

## 2024-11-04 DIAGNOSIS — D64.9 ANEMIA, UNSPECIFIED TYPE: ICD-10-CM

## 2024-11-04 RX ORDER — METOPROLOL SUCCINATE 50 MG/1
50 TABLET, EXTENDED RELEASE ORAL DAILY
Qty: 90 TABLET | Refills: 3 | Status: SHIPPED | OUTPATIENT
Start: 2024-11-04

## 2024-11-04 NOTE — PROGRESS NOTES
[facility] CARDIOLOGY, [address], [address]  [place] [address]  PHONE: [phone]    SUBJECTIVE:   Joaquim Ricci is a 78 y.o. male 1945   seen for a follow up visit regarding the following:     Chief Complaint   Patient presents with    Atrial Fibrillation         History of Present Illness  The patient is a 78-year-old male who presents for evaluation of anemia.    At our previous appointment, he had issues with anemia and was referred for a gastroenterology workup. He underwent an endoscopy on 10/16/2024 in the [facility] by Dr. Zak Matias. His gastroenterologist, Dr. Matias, found no abnormalities during the examination. He also had infusion therapy performed by hematology oncology, Dr. Dye. The second ferritin infusion was effective, and he feels better, although he still experiences fatigue.    His mobility is limited due to Peripheral Artery Disease (PAD). He has an upcoming appointment with a vascular physician assistant.    His hematologist, Dr. Dye, recommended an endoscopy and He expresses concern about the possibility of bone marrow issues. He is considering switching back to Medicare with a supplement due to the potential cost of the Watchman procedure. He is currently taking Eliquis.        Interval history:  6/23/2022 previous history of coronary artery disease status post coronary artery bypass grafting in 1985 PCI in 2014.  Additional history of paroxysmal atrial fibrillation.  Carotid studies performed in the past with mildly elevated velocities.       Cardiac History:  Coronary artery bypass grafting in 1985  PCI 2014 2018 cardioversion  Echocardiogram 5/2020 1/2022 echocardiogram [facility] ejection fraction 55 to 65% mild aortic stenosis.  Grade 2 diastolic dysfunction  6/28/2023 EKG: sinus rhythm, normal rate, normal UT intervals, nonspecific ST abnormality  2/22/2024 EKG sinus bradycardia with rate variation  8/19/2024 EKG sinus bradycardia

## 2024-11-04 NOTE — TELEPHONE ENCOUNTER
Joaquim Ricci was contacted to discuss LAAO scheduling.  Educational information/booklet emailed to the patient regarding stroke prevention options for patients with Atrial Fibrillation. [facility] CardioSmart tool provided for review as the shared decision making process continues between patient and physicians. Patient had a watchman consult with Dr. Martin on 10/24/2024.  His Hematologist and primary cardiologist have been consulted re:LAAC and post implant medication regimen.  He will discuss upcoming implant dates with his spouse and follow up.  Watchman coordinator contact ([phone]) information provided.

## 2024-11-04 NOTE — TELEPHONE ENCOUNTER
Please let patient know I spoke with Dr. Martin after the appointment.  He would prefer to do it sooner rather than later.  He thinks 1 week of Eliquis is sufficient.  We have let the watchman coordinator know.

## 2024-11-05 ENCOUNTER — TELEPHONE (OUTPATIENT)
Dept: CARDIAC CATH/INVASIVE PROCEDURES | Age: 79
End: 2024-11-05

## 2024-11-05 NOTE — TELEPHONE ENCOUNTER
Joaquim Ricci was phoned to discuss LAAO.  Educational information/booklet emailed to the patient regarding stroke prevention options for patients with Atrial Fibrillation. [facility] CardioSmart tool provided for review as the shared decision making process continues between patient and physicians. The patient had a watchman consult with Dr. Martin on 10/24/24. He will be scheduled for a Watchman implant on 11/26/2024.  Patient has been instructed to begin Eliquis 5 mg twice daily on 11/18 and continue this through 11/24/24. He will hold eliquis on 11/25 & 11/26. Patient will be contacted the week prior to procedure with an arrival time and additional instructions. Watchman coordinator contact ([phone]) information provided.

## 2024-11-10 NOTE — PROGRESS NOTES
Joaquim Ricci has been referred to the [facility] for evaluation for Left Atrial Appendage Closure with a FDA-approved Watchman device for management of stroke risk resulting from non-valvular atrial fibrillation.    Based on this patient's history, it has been determined that this patient is a poor candidate for long-term oral anticoagulation, however may be tolerant of short term treatment as needed for watchman implantation.     Specifically regarding anticoagulation, the patient has demonstrated: Anti-coagulation History: Documented poor compliance or intolerance with oral anti-coagulation therapy.  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       The patient and I have discussed their unique stroke and bleeding risk both on and off oral-anticoagulation, and the rationale for this referral. Their individual [id number] stroke risk score, based on past history is indicated below**.  Select all that apply based on patient history:    Atrial Fibrillation CHADSVASC2 Score Stroke Risk:_  78 y.o. > 75 - 2    male Male - 0   CHF HX: No - 0   HTN HX: Yes - 1   Stroke/TIA/Thromboembolism No - 0   Vascular Disease HX: Yes - 1   Diabetes Mellitus No - 0   CHADSVASC 2 Score 4      Annual Stroke Risk 4.8%- moderate-high                Vascular disease: PCI, CABG, and CAD    HAS-BLED Score     HTN Hx [x] 1 Point   Renal Disease  [] 1 Point   Liver Disease [] 1 Point   Stroke Hx [] 1 Point   Prior Major Bleeding or Predisposition [x] 1 Point   Labile INR [] 1 Point   Age > 65 Years Current: 78 y.o.   [x] 1 Point   Rx Usage Predisposing to Bleeding [] 1 Point   Alcohol Use (> or = 8 Drinks/wk) [] 1 Point   Total: UCHASBLED: Score 3 High Risk 5.8% Risk of major bleeding 3.72  Bleeds Per 100 pts years Alternatives to Anticoagulation can be considered       Dr. Armaan Lyons and Dr. Ayo Martin have both had a face-to-face

## 2024-11-11 DIAGNOSIS — D64.9 ANEMIA, UNSPECIFIED TYPE: ICD-10-CM

## 2024-11-11 LAB
BASOPHILS # BLD: 0 K/UL (ref 0–0.2)
BASOPHILS NFR BLD: 1 % (ref 0–2)
DIFFERENTIAL METHOD BLD: ABNORMAL
EOSINOPHIL # BLD: 0.1 K/UL (ref 0–0.8)
EOSINOPHIL NFR BLD: 2 % (ref 0.5–7.8)
ERYTHROCYTE [DISTWIDTH] IN BLOOD BY AUTOMATED COUNT: 13.7 % (ref 11.9–14.6)
HCT VFR BLD AUTO: 34.1 % (ref 41.1–50.3)
HGB BLD-MCNC: 10.5 G/DL (ref 13.6–17.2)
IMM GRANULOCYTES # BLD AUTO: 0 K/UL (ref 0–0.5)
IMM GRANULOCYTES NFR BLD AUTO: 0 % (ref 0–5)
LYMPHOCYTES # BLD: 1 K/UL (ref 0.5–4.6)
LYMPHOCYTES NFR BLD: 23 % (ref 13–44)
MCH RBC QN AUTO: 32.4 PG (ref 26.1–32.9)
MCHC RBC AUTO-ENTMCNC: 30.8 G/DL (ref 31.4–35)
MCV RBC AUTO: 105.2 FL (ref 82–102)
MONOCYTES # BLD: 0.7 K/UL (ref 0.1–1.3)
MONOCYTES NFR BLD: 15 % (ref 4–12)
NEUTS SEG # BLD: 2.6 K/UL (ref 1.7–8.2)
NEUTS SEG NFR BLD: 59 % (ref 43–78)
NRBC # BLD: 0 K/UL (ref 0–0.2)
PLATELET # BLD AUTO: 151 K/UL (ref 150–450)
PMV BLD AUTO: 10.9 FL (ref 9.4–12.3)
RBC # BLD AUTO: 3.24 M/UL (ref 4.23–5.6)
WBC # BLD AUTO: 4.4 K/UL (ref 4.3–11.1)

## 2024-11-19 ENCOUNTER — TELEPHONE (OUTPATIENT)
Age: 79
End: 2024-11-19

## 2024-11-19 NOTE — PROGRESS NOTES
Patient pre-assessment complete for LAAO scheduled for 2024, arrival time 0600. Patient verified using . Patient instructed to bring all home medications in labeled bottles on the day of procedure. Nothing to eat after midnight, drink 32 ounces of a clear non-caffeinated liquid two hours prior to arrival. Patient's last dose of eliquis will be on 2024. He will take acetaminophen, allopurinol, doxazosin, metoprolol, omeprazole, and tamsulosin with a small sip of water, the morning of his procedure. Patient instructed to HOLD all other medications and supplements. Patient verbalizes understanding of all instructions & denies any questions at this time. Patient has watchman contact ([phone]) information for questions.

## 2024-11-20 ENCOUNTER — PREP FOR PROCEDURE (OUTPATIENT)
Age: 79
End: 2024-11-20

## 2024-11-20 DIAGNOSIS — I48.0 PAROXYSMAL ATRIAL FIBRILLATION (HCC): Primary | ICD-10-CM

## 2024-11-25 ENCOUNTER — TELEPHONE (OUTPATIENT)
Dept: CARDIAC CATH/INVASIVE PROCEDURES | Age: 79
End: 2024-11-25

## 2024-11-25 ENCOUNTER — ANESTHESIA EVENT (OUTPATIENT)
Dept: SURGERY | Age: 79
DRG: 274 | End: 2024-11-25
Payer: MEDICARE

## 2024-11-25 ENCOUNTER — HOSPITAL ENCOUNTER (OUTPATIENT)
Dept: LAB | Age: 79
Setting detail: SPECIMEN
Discharge: HOME | DRG: 274 | End: 2024-11-28
Payer: MEDICARE

## 2024-11-25 ENCOUNTER — HOSPITAL ENCOUNTER (OUTPATIENT)
Dept: LAB | Age: 79
Discharge: HOME | End: 2024-11-28
Payer: MEDICARE

## 2024-11-25 DIAGNOSIS — I48.0 PAROXYSMAL ATRIAL FIBRILLATION (HCC): ICD-10-CM

## 2024-11-25 LAB
ALBUMIN SERPL-MCNC: 4.3 G/DL (ref 3.2–4.6)
ANION GAP SERPL CALC-SCNC: 13 MMOL/L (ref 7–16)
BASOPHILS # BLD: 0 K/UL (ref 0–0.2)
BASOPHILS NFR BLD: 1 % (ref 0–2)
BUN SERPL-MCNC: 42 MG/DL (ref 8–23)
CALCIUM SERPL-MCNC: 9.1 MG/DL (ref 8.8–10.2)
CHLORIDE SERPL-SCNC: 102 MMOL/L (ref 98–107)
CO2 SERPL-SCNC: 25 MMOL/L (ref 20–29)
CREAT SERPL-MCNC: 1.49 MG/DL (ref 0.8–1.3)
DIFFERENTIAL METHOD BLD: ABNORMAL
EOSINOPHIL # BLD: 0.1 K/UL (ref 0–0.8)
EOSINOPHIL NFR BLD: 3 % (ref 0.5–7.8)
ERYTHROCYTE [DISTWIDTH] IN BLOOD BY AUTOMATED COUNT: 13.7 % (ref 11.9–14.6)
GLUCOSE SERPL-MCNC: 152 MG/DL (ref 70–99)
HCT VFR BLD AUTO: 34 % (ref 41.1–50.3)
HGB BLD-MCNC: 10.8 G/DL (ref 13.6–17.2)
IMM GRANULOCYTES # BLD AUTO: 0 K/UL (ref 0–0.5)
IMM GRANULOCYTES NFR BLD AUTO: 0 % (ref 0–5)
INR PPP: 1
LYMPHOCYTES # BLD: 1.2 K/UL (ref 0.5–4.6)
LYMPHOCYTES NFR BLD: 28 % (ref 13–44)
MCH RBC QN AUTO: 32.1 PG (ref 26.1–32.9)
MCHC RBC AUTO-ENTMCNC: 31.8 G/DL (ref 31.4–35)
MCV RBC AUTO: 101.2 FL (ref 82–102)
MONOCYTES # BLD: 0.5 K/UL (ref 0.1–1.3)
MONOCYTES NFR BLD: 12 % (ref 4–12)
NEUTS SEG # BLD: 2.5 K/UL (ref 1.7–8.2)
NEUTS SEG NFR BLD: 56 % (ref 43–78)
NRBC # BLD: 0 K/UL (ref 0–0.2)
PLATELET # BLD AUTO: 176 K/UL (ref 150–450)
PMV BLD AUTO: 10.5 FL (ref 9.4–12.3)
POTASSIUM SERPL-SCNC: 4.9 MMOL/L (ref 3.5–5.1)
PROTHROMBIN TIME: 13.5 SEC (ref 11.3–14.9)
RBC # BLD AUTO: 3.36 M/UL (ref 4.23–5.6)
SODIUM SERPL-SCNC: 140 MMOL/L (ref 136–145)
WBC # BLD AUTO: 4.4 K/UL (ref 4.3–11.1)

## 2024-11-25 PROCEDURE — 86901 BLOOD TYPING SEROLOGIC RH(D): CPT

## 2024-11-25 PROCEDURE — 80048 BASIC METABOLIC PNL TOTAL CA: CPT

## 2024-11-25 PROCEDURE — 36415 COLL VENOUS BLD VENIPUNCTURE: CPT

## 2024-11-25 PROCEDURE — 86850 RBC ANTIBODY SCREEN: CPT

## 2024-11-25 PROCEDURE — 85025 COMPLETE CBC W/AUTO DIFF WBC: CPT

## 2024-11-25 PROCEDURE — 85610 PROTHROMBIN TIME: CPT

## 2024-11-25 PROCEDURE — 86900 BLOOD TYPING SEROLOGIC ABO: CPT

## 2024-11-25 PROCEDURE — 82040 ASSAY OF SERUM ALBUMIN: CPT

## 2024-11-25 NOTE — TELEPHONE ENCOUNTER
Rocky phoned to confirm approval for LAAO scheduled for 11/26/2024 with  Authorization # [id number].

## 2024-11-26 ENCOUNTER — APPOINTMENT (OUTPATIENT)
Dept: CARDIAC CATH/INVASIVE PROCEDURES | Age: 79
DRG: 274 | End: 2024-11-26
Attending: INTERNAL MEDICINE
Payer: MEDICARE

## 2024-11-26 ENCOUNTER — ANESTHESIA (OUTPATIENT)
Dept: SURGERY | Age: 79
DRG: 274 | End: 2024-11-26
Payer: MEDICARE

## 2024-11-26 ENCOUNTER — HOSPITAL ENCOUNTER (INPATIENT)
Age: 79
LOS: 1 days | Discharge: HOME | DRG: 274 | End: 2024-11-26
Attending: INTERNAL MEDICINE | Admitting: INTERNAL MEDICINE
Payer: MEDICARE

## 2024-11-26 VITALS
RESPIRATION RATE: 16 BRPM | HEIGHT: 67 IN | BODY MASS INDEX: 28.25 KG/M2 | OXYGEN SATURATION: 98 % | SYSTOLIC BLOOD PRESSURE: 120 MMHG | DIASTOLIC BLOOD PRESSURE: 60 MMHG | WEIGHT: 180 LBS | TEMPERATURE: 97.4 F | HEART RATE: 60 BPM

## 2024-11-26 DIAGNOSIS — I48.0 PAROXYSMAL A-FIB (HCC): ICD-10-CM

## 2024-11-26 LAB
ABO + RH BLD: NORMAL
ACT BLD: 250 SECS (ref 70–128)
BLOOD GROUP ANTIBODIES SERPL: NORMAL
ECHO BSA: 1.96 M2
ECHO LV EF PHYSICIAN: 65 %
EKG ATRIAL RATE: 58 BPM
EKG DIAGNOSIS: NORMAL
EKG P AXIS: 30 DEGREES
EKG P-R INTERVAL: 120 MS
EKG Q-T INTERVAL: 496 MS
EKG QRS DURATION: 90 MS
EKG QTC CALCULATION (BAZETT): 478 MS
EKG R AXIS: 41 DEGREES
EKG T AXIS: 77 DEGREES
EKG VENTRICULAR RATE: 56 BPM
SPECIMEN EXP DATE BLD: NORMAL

## 2024-11-26 PROCEDURE — 7100000001 HC PACU RECOVERY - ADDTL 15 MIN: Performed by: INTERNAL MEDICINE

## 2024-11-26 PROCEDURE — 6370000000 HC RX 637 (ALT 250 FOR IP): Performed by: SURGERY

## 2024-11-26 PROCEDURE — 3700000000 HC ANESTHESIA ATTENDED CARE: Performed by: INTERNAL MEDICINE

## 2024-11-26 PROCEDURE — 1100000000 HC RM PRIVATE

## 2024-11-26 PROCEDURE — 7100000010 HC PHASE II RECOVERY - FIRST 15 MIN: Performed by: INTERNAL MEDICINE

## 2024-11-26 PROCEDURE — 93662 INTRACARDIAC ECG (ICE): CPT | Performed by: INTERNAL MEDICINE

## 2024-11-26 PROCEDURE — 2580000003 HC RX 258: Performed by: SURGERY

## 2024-11-26 PROCEDURE — 3700000001 HC ADD 15 MINUTES (ANESTHESIA): Performed by: INTERNAL MEDICINE

## 2024-11-26 PROCEDURE — B24BZZ4 ULTRASONOGRAPHY OF HEART WITH AORTA, TRANSESOPHAGEAL: ICD-10-PCS | Performed by: INTERNAL MEDICINE

## 2024-11-26 PROCEDURE — 02L73DK OCCLUSION OF LEFT ATRIAL APPENDAGE WITH INTRALUMINAL DEVICE, PERCUTANEOUS APPROACH: ICD-10-PCS | Performed by: INTERNAL MEDICINE

## 2024-11-26 PROCEDURE — 7100000011 HC PHASE II RECOVERY - ADDTL 15 MIN: Performed by: INTERNAL MEDICINE

## 2024-11-26 PROCEDURE — 93010 ELECTROCARDIOGRAM REPORT: CPT | Performed by: INTERNAL MEDICINE

## 2024-11-26 PROCEDURE — 85347 COAGULATION TIME ACTIVATED: CPT

## 2024-11-26 PROCEDURE — C1759 CATH, INTRA ECHOCARDIOGRAPHY: HCPCS | Performed by: INTERNAL MEDICINE

## 2024-11-26 PROCEDURE — 2500000003 HC RX 250 WO HCPCS: Performed by: NURSE ANESTHETIST, CERTIFIED REGISTERED

## 2024-11-26 PROCEDURE — 33340 PERQ CLSR TCAT L ATR APNDGE: CPT | Performed by: INTERNAL MEDICINE

## 2024-11-26 PROCEDURE — C1894 INTRO/SHEATH, NON-LASER: HCPCS | Performed by: INTERNAL MEDICINE

## 2024-11-26 PROCEDURE — 6360000004 HC RX CONTRAST MEDICATION: Performed by: INTERNAL MEDICINE

## 2024-11-26 PROCEDURE — C1889 IMPLANT/INSERT DEVICE, NOC: HCPCS | Performed by: INTERNAL MEDICINE

## 2024-11-26 PROCEDURE — 6360000002 HC RX W HCPCS: Performed by: NURSE ANESTHETIST, CERTIFIED REGISTERED

## 2024-11-26 PROCEDURE — 2709999900 HC NON-CHARGEABLE SUPPLY: Performed by: INTERNAL MEDICINE

## 2024-11-26 PROCEDURE — C1760 CLOSURE DEV, VASC: HCPCS | Performed by: INTERNAL MEDICINE

## 2024-11-26 PROCEDURE — C1769 GUIDE WIRE: HCPCS | Performed by: INTERNAL MEDICINE

## 2024-11-26 PROCEDURE — 7100000000 HC PACU RECOVERY - FIRST 15 MIN: Performed by: INTERNAL MEDICINE

## 2024-11-26 PROCEDURE — 93005 ELECTROCARDIOGRAM TRACING: CPT | Performed by: INTERNAL MEDICINE

## 2024-11-26 PROCEDURE — 6360000002 HC RX W HCPCS: Performed by: INTERNAL MEDICINE

## 2024-11-26 PROCEDURE — 2580000003 HC RX 258: Performed by: NURSE ANESTHETIST, CERTIFIED REGISTERED

## 2024-11-26 PROCEDURE — 93312 ECHO TRANSESOPHAGEAL: CPT

## 2024-11-26 DEVICE — LEFT ATRIAL APPENDAGE CLOSURE DEVICE WITH DELIVERY SYSTEM
Type: IMPLANTABLE DEVICE | Status: FUNCTIONAL
Brand: WATCHMAN FLX™ PRO

## 2024-11-26 RX ORDER — HEPARIN SODIUM 200 [USP'U]/100ML
INJECTION, SOLUTION INTRAVENOUS CONTINUOUS PRN
Status: DISCONTINUED | OUTPATIENT
Start: 2024-11-26 | End: 2024-11-26 | Stop reason: HOSPADM

## 2024-11-26 RX ORDER — SODIUM CHLORIDE 0.9 % (FLUSH) 0.9 %
5-40 SYRINGE (ML) INJECTION EVERY 12 HOURS SCHEDULED
Status: DISCONTINUED | OUTPATIENT
Start: 2024-11-26 | End: 2024-11-26 | Stop reason: HOSPADM

## 2024-11-26 RX ORDER — IOPAMIDOL 755 MG/ML
INJECTION, SOLUTION INTRAVASCULAR PRN
Status: DISCONTINUED | OUTPATIENT
Start: 2024-11-26 | End: 2024-11-26 | Stop reason: HOSPADM

## 2024-11-26 RX ORDER — DEXAMETHASONE SODIUM PHOSPHATE 10 MG/ML
INJECTION INTRAMUSCULAR; INTRAVENOUS
Status: DISCONTINUED | OUTPATIENT
Start: 2024-11-26 | End: 2024-11-26 | Stop reason: SDUPTHER

## 2024-11-26 RX ORDER — SODIUM CHLORIDE, SODIUM LACTATE, POTASSIUM CHLORIDE, CALCIUM CHLORIDE 600; 310; 30; 20 MG/100ML; MG/100ML; MG/100ML; MG/100ML
INJECTION, SOLUTION INTRAVENOUS CONTINUOUS
Status: DISCONTINUED | OUTPATIENT
Start: 2024-11-26 | End: 2024-11-26 | Stop reason: HOSPADM

## 2024-11-26 RX ORDER — OXYCODONE HYDROCHLORIDE 5 MG/1
5 TABLET ORAL
Status: COMPLETED | OUTPATIENT
Start: 2024-11-26 | End: 2024-11-26

## 2024-11-26 RX ORDER — PROCHLORPERAZINE EDISYLATE 5 MG/ML
5 INJECTION INTRAMUSCULAR; INTRAVENOUS
Status: DISCONTINUED | OUTPATIENT
Start: 2024-11-26 | End: 2024-11-26 | Stop reason: HOSPADM

## 2024-11-26 RX ORDER — EPHEDRINE SULFATE 5 MG/ML
INJECTION INTRAVENOUS
Status: DISCONTINUED | OUTPATIENT
Start: 2024-11-26 | End: 2024-11-26 | Stop reason: SDUPTHER

## 2024-11-26 RX ORDER — IPRATROPIUM BROMIDE AND ALBUTEROL SULFATE 2.5; .5 MG/3ML; MG/3ML
1 SOLUTION RESPIRATORY (INHALATION)
Status: DISCONTINUED | OUTPATIENT
Start: 2024-11-26 | End: 2024-11-26 | Stop reason: HOSPADM

## 2024-11-26 RX ORDER — HEPARIN SODIUM 1000 [USP'U]/ML
INJECTION, SOLUTION INTRAVENOUS; SUBCUTANEOUS
Status: DISCONTINUED | OUTPATIENT
Start: 2024-11-26 | End: 2024-11-26 | Stop reason: SDUPTHER

## 2024-11-26 RX ORDER — NALOXONE HYDROCHLORIDE 0.4 MG/ML
INJECTION, SOLUTION INTRAMUSCULAR; INTRAVENOUS; SUBCUTANEOUS PRN
Status: DISCONTINUED | OUTPATIENT
Start: 2024-11-26 | End: 2024-11-26 | Stop reason: HOSPADM

## 2024-11-26 RX ORDER — HALOPERIDOL 5 MG/ML
1 INJECTION INTRAMUSCULAR
Status: DISCONTINUED | OUTPATIENT
Start: 2024-11-26 | End: 2024-11-26 | Stop reason: HOSPADM

## 2024-11-26 RX ORDER — LIDOCAINE HYDROCHLORIDE 10 MG/ML
INJECTION, SOLUTION INFILTRATION; PERINEURAL PRN
Status: DISCONTINUED | OUTPATIENT
Start: 2024-11-26 | End: 2024-11-26 | Stop reason: HOSPADM

## 2024-11-26 RX ORDER — LABETALOL HYDROCHLORIDE 5 MG/ML
10 INJECTION, SOLUTION INTRAVENOUS
Status: DISCONTINUED | OUTPATIENT
Start: 2024-11-26 | End: 2024-11-26 | Stop reason: HOSPADM

## 2024-11-26 RX ORDER — SODIUM CHLORIDE 0.9 % (FLUSH) 0.9 %
5-40 SYRINGE (ML) INJECTION PRN
Status: DISCONTINUED | OUTPATIENT
Start: 2024-11-26 | End: 2024-11-26 | Stop reason: HOSPADM

## 2024-11-26 RX ORDER — ETOMIDATE 2 MG/ML
INJECTION INTRAVENOUS
Status: DISCONTINUED | OUTPATIENT
Start: 2024-11-26 | End: 2024-11-26 | Stop reason: SDUPTHER

## 2024-11-26 RX ORDER — HYDROMORPHONE HYDROCHLORIDE 2 MG/ML
0.5 INJECTION, SOLUTION INTRAMUSCULAR; INTRAVENOUS; SUBCUTANEOUS EVERY 5 MIN PRN
Status: DISCONTINUED | OUTPATIENT
Start: 2024-11-26 | End: 2024-11-26 | Stop reason: HOSPADM

## 2024-11-26 RX ORDER — SODIUM CHLORIDE 0.9 % (FLUSH) 0.9 %
SYRINGE (ML) INJECTION
Status: DISCONTINUED | OUTPATIENT
Start: 2024-11-26 | End: 2024-11-26 | Stop reason: SDUPTHER

## 2024-11-26 RX ORDER — PROTAMINE SULFATE 10 MG/ML
INJECTION, SOLUTION INTRAVENOUS
Status: DISCONTINUED | OUTPATIENT
Start: 2024-11-26 | End: 2024-11-26 | Stop reason: SDUPTHER

## 2024-11-26 RX ORDER — ONDANSETRON 2 MG/ML
INJECTION INTRAMUSCULAR; INTRAVENOUS
Status: DISCONTINUED | OUTPATIENT
Start: 2024-11-26 | End: 2024-11-26 | Stop reason: SDUPTHER

## 2024-11-26 RX ORDER — CEFAZOLIN SODIUM 1 G/3ML
INJECTION, POWDER, FOR SOLUTION INTRAMUSCULAR; INTRAVENOUS
Status: DISCONTINUED | OUTPATIENT
Start: 2024-11-26 | End: 2024-11-26 | Stop reason: SDUPTHER

## 2024-11-26 RX ORDER — SODIUM CHLORIDE 9 MG/ML
INJECTION, SOLUTION INTRAVENOUS PRN
Status: DISCONTINUED | OUTPATIENT
Start: 2024-11-26 | End: 2024-11-26 | Stop reason: HOSPADM

## 2024-11-26 RX ORDER — LIDOCAINE HYDROCHLORIDE 10 MG/ML
1 INJECTION, SOLUTION INFILTRATION; PERINEURAL
Status: DISCONTINUED | OUTPATIENT
Start: 2024-11-26 | End: 2024-11-26 | Stop reason: HOSPADM

## 2024-11-26 RX ORDER — LIDOCAINE HYDROCHLORIDE 20 MG/ML
INJECTION, SOLUTION INTRAVENOUS
Status: DISCONTINUED | OUTPATIENT
Start: 2024-11-26 | End: 2024-11-26 | Stop reason: SDUPTHER

## 2024-11-26 RX ORDER — ROCURONIUM BROMIDE 10 MG/ML
INJECTION, SOLUTION INTRAVENOUS
Status: DISCONTINUED | OUTPATIENT
Start: 2024-11-26 | End: 2024-11-26 | Stop reason: SDUPTHER

## 2024-11-26 RX ADMIN — HEPARIN SODIUM 2000 UNITS: 1000 INJECTION, SOLUTION INTRAVENOUS; SUBCUTANEOUS at 08:57

## 2024-11-26 RX ADMIN — ETOMIDATE 24 MG: 2 INJECTION, SOLUTION INTRAVENOUS at 08:11

## 2024-11-26 RX ADMIN — PHENYLEPHRINE HYDROCHLORIDE 100 MCG: 10 INJECTION INTRAVENOUS at 08:59

## 2024-11-26 RX ADMIN — HEPARIN SODIUM 6000 UNITS: 1000 INJECTION, SOLUTION INTRAVENOUS; SUBCUTANEOUS at 08:42

## 2024-11-26 RX ADMIN — EPHEDRINE SULFATE 5 MG: 5 INJECTION INTRAVENOUS at 08:34

## 2024-11-26 RX ADMIN — EPHEDRINE SULFATE 5 MG: 5 INJECTION INTRAVENOUS at 08:32

## 2024-11-26 RX ADMIN — HEPARIN SODIUM 5000 UNITS: 1000 INJECTION, SOLUTION INTRAVENOUS; SUBCUTANEOUS at 08:39

## 2024-11-26 RX ADMIN — ROCURONIUM BROMIDE 40 MG: 10 INJECTION, SOLUTION INTRAVENOUS at 08:11

## 2024-11-26 RX ADMIN — SUGAMMADEX 200 MG: 100 INJECTION, SOLUTION INTRAVENOUS at 09:07

## 2024-11-26 RX ADMIN — ONDANSETRON 4 MG: 2 INJECTION INTRAMUSCULAR; INTRAVENOUS at 08:58

## 2024-11-26 RX ADMIN — DEXAMETHASONE SODIUM PHOSPHATE 10 MG: 10 INJECTION INTRAMUSCULAR; INTRAVENOUS at 08:28

## 2024-11-26 RX ADMIN — EPHEDRINE SULFATE 5 MG: 5 INJECTION INTRAVENOUS at 08:49

## 2024-11-26 RX ADMIN — PHENYLEPHRINE HYDROCHLORIDE 100 MCG: 10 INJECTION INTRAVENOUS at 08:56

## 2024-11-26 RX ADMIN — OXYCODONE 5 MG: 5 TABLET ORAL at 10:34

## 2024-11-26 RX ADMIN — EPHEDRINE SULFATE 10 MG: 5 INJECTION INTRAVENOUS at 08:54

## 2024-11-26 RX ADMIN — LIDOCAINE HYDROCHLORIDE 100 MG: 20 INJECTION, SOLUTION INTRAVENOUS at 08:11

## 2024-11-26 RX ADMIN — CEFAZOLIN 2 G: 1 INJECTION, POWDER, FOR SOLUTION INTRAMUSCULAR; INTRAVENOUS at 08:25

## 2024-11-26 RX ADMIN — SODIUM CHLORIDE, SODIUM LACTATE, POTASSIUM CHLORIDE, AND CALCIUM CHLORIDE: 600; 310; 30; 20 INJECTION, SOLUTION INTRAVENOUS at 08:08

## 2024-11-26 RX ADMIN — EPHEDRINE SULFATE 10 MG: 5 INJECTION INTRAVENOUS at 09:02

## 2024-11-26 RX ADMIN — PROTAMINE SULFATE 50 MG: 10 INJECTION, SOLUTION INTRAVENOUS at 09:05

## 2024-11-26 RX ADMIN — SODIUM CHLORIDE, PRESERVATIVE FREE 20 ML: 5 INJECTION INTRAVENOUS at 08:11

## 2024-11-26 ASSESSMENT — PAIN DESCRIPTION - DESCRIPTORS: DESCRIPTORS: SORE

## 2024-11-26 ASSESSMENT — PAIN DESCRIPTION - ORIENTATION: ORIENTATION: LOWER;UPPER

## 2024-11-26 ASSESSMENT — PAIN SCALES - GENERAL: PAINLEVEL_OUTOF10: 6

## 2024-11-26 ASSESSMENT — PAIN - FUNCTIONAL ASSESSMENT
PAIN_FUNCTIONAL_ASSESSMENT: NONE - DENIES PAIN
PAIN_FUNCTIONAL_ASSESSMENT: 0-10

## 2024-11-26 ASSESSMENT — PAIN DESCRIPTION - LOCATION: LOCATION: BACK

## 2024-11-26 NOTE — PROGRESS NOTES
Patient received to CPRU room # 17  Ambulatory from [facility]. Patient scheduled for LAAO today with Dr Martin. Procedure reviewed & questions answered, voiced good understanding consent obtained & placed on chart. All medications and medical history reviewed. Will prep patient per orders. Patient & family updated on plan of care.      The patient has a fraility score of 3-MANAGING WELL, based on patient A&Ox3, patient able to ambulate to room without difficulty.

## 2024-11-26 NOTE — ANESTHESIA PRE PROCEDURE
\"POCK\", \"POCCL\", \"POCBUN\", \"POCHEMO\", \"POCHCT\" in the last 72 hours.    Coags:   Lab Results   Component Value Date/Time    PROTIME 13.5 11/25/2024 09:53 AM    INR 1.0 11/25/2024 09:53 AM       HCG (If Applicable): No results found for: \"PREGTESTUR\", \"PREGSERUM\", \"HCG\", \"HCGQUANT\"     ABGs: No results found for: \"PHART\", \"PO2ART\", \"ECR3SFE\", \"TOM7HOB\", \"BEART\", \"U0ZSNYFP\"     Type & Screen (If Applicable):  Lab Results   Component Value Date    ABORH O POSITIVE 11/25/2024    LABANTI NEG 11/25/2024       Drug/Infectious Status (If Applicable):  No results found for: \"HIV\", \"HEPCAB\"    COVID-19 Screening (If Applicable): No results found for: \"COVID19\"        Anesthesia Evaluation     no history of anesthetic complications:   Airway: Mallampati: III  TM distance: >3 FB   Neck ROM: full  Mouth opening: > = 3 FB   Dental: normal exam         Pulmonary:Negative Pulmonary ROS and normal exam                               Cardiovascular:    (+) hypertension:, valvular problems/murmurs: AS, CAD: no interval change, dysrhythmias: atrial fibrillation    (-) PND and  CHAPMAN             ROS comment: TTE 8/2024:    ·  Left Ventricle: Normal left ventricular systolic function. EF by 2D Simpsons Biplane is 79%. Left ventricle size is normal. Moderately increased wall thickness. Normal wall motion. Abnormal diastolic function.  ·  Aortic Valve: Trileaflet valve. Moderately calcified cusps. Moderate stenosis of the aortic valve. AV mean gradient is 29 mmHg. AV peak velocity is 3.7 m/s. AV Velocity Ratio is 0.24. LVOT:AV VTI Index is 0.27. LVOT diameter is 2.0 cm. AV area by continuity VTI is 0.8 cm2. AV Stroke Volume index is 32.0 mL/m2.  ·  Mitral Valve: Severe annular calcification at the posterior leaflet. Thickened leaflets. Moderate regurgitation.  ·  Right Atrium: Right atrium is moderately dilated.  ·  Aorta: Ao root diameter is 3.4 cm. Ao Root Index is 1.72 cm/m2.  ·  Image quality is adequate.       Neuro/Psych:   Negative

## 2024-11-26 NOTE — DISCHARGE INSTRUCTIONS
Watchman Discharge Instructions      Activity:    Follow your doctor’s recommendations  Return to normal activities gradually, pacing yourself as you feel better, resting when tired.  Activity should be limited for the next 48 hours. Climb stairs as little as possible and avoid any stooping, bending or strenuous activity for 48 hours. No heavy lifting (anything over 10 pounds) for five days.  Do not drive for 48 hoursHave a responsible person drive you home and stay with you for at least 24 hours after your heart catheterization/angiography.  Check the puncture site frequently for swelling or bleeding. If you see any bleeding, lie down and apply pressure over the area with a clean town or washcloth. Notify your doctor for any redness, swelling, drainage or oozing from the puncture site. Notify your doctor for any fever or chills.  You may resume your usual diet. Drink more fluids than usual.        Incision / Wound Care      Cleanse wounds with mild soap and water. Keep wound dry.  A small amount of bloody or clear drainage is normal.  Watch for redness, swelling, incision site hot to touch, foul or colored drainage from the incision site, these are all signs of infection and should be reported immediately to the physicians.  If there is site concern please notify your implanting physician.  You may remove the bandage from your Right and Groin in 24 hours. You may shower in 24 hours. No tub baths, hot tubs or swimming for one week. Do not place any lotions, creams, powders, ointments over the puncture site for one week. You may place a clean band-aid over the puncture site each day for 5 days. Change this daily.     Continued        Medications:  DO NOT STOP TAKING YOUR ELIQUIS  WITHOUT SPEAKING WITH YOUR CARDIOLOGIST FIRST!  Take your medications as ordered at the time of discharge.  Do NOT stop taking any medications without first discussing it with your doctor.  Notify all your doctors of current medication

## 2024-11-26 NOTE — PERIOP NOTE
TRANSFER - OUT REPORT:    Verbal report given to Sary Tatum RN on Joaquim Ricci  being transferred to  for routine post-op       Report consisted of patient’s Situation, Background, Assessment and   Recommendations(SBAR).     Information from the following report(s) Nurse Handoff Report, Adult Overview, Surgery Report, Intake/Output, MAR, and Cardiac Rhythm A-Fib  was reviewed with the receiving nurse.    Lines:   Peripheral IV 11/26/24 Right Antecubital (Active)   Site Assessment Clean, dry & intact 11/26/24 0949   Line Status Flushed 11/26/24 0949   Line Care Connections checked and tightened 11/26/24 0949   Phlebitis Assessment No symptoms 11/26/24 0949   Infiltration Assessment 0 11/26/24 0949   Alcohol Cap Used No 11/26/24 0949   Dressing Status Clean, dry & intact 11/26/24 0949   Dressing Type Transparent 11/26/24 0949       Peripheral IV 11/26/24 Left Forearm (Active)   Site Assessment Clean, dry & intact 11/26/24 0949   Line Status Flushed 11/26/24 0949   Line Care Connections checked and tightened 11/26/24 0949   Phlebitis Assessment No symptoms 11/26/24 0949   Infiltration Assessment 0 11/26/24 0949   Alcohol Cap Used No 11/26/24 0949   Dressing Status Clean, dry & intact 11/26/24 0949   Dressing Type Transparent 11/26/24 0949        Opportunity for questions and clarification was provided.      Patient transported with:   Monitor  Registered Nurse    VTE prophylaxis orders have been written for Joaquim Ricci.    Patient and family given floor number and nurses name.  Family updated re: pt status after security code verified.

## 2024-11-26 NOTE — DISCHARGE SUMMARY
[facility] Cardiology Discharge Summary     Patient ID:  Joaquim Ricci  [id number]  79 y.o.  1945    Admit date: 11/26/2024    Discharge date and time: 11/26/24    Admitting Physician: Ayo Martin MD     Discharge Physician: Dr. Martin/ Gerson Celestin NP    Admission Diagnoses: Paroxysmal A-fib (HCC) [I48.0]  Paroxysmal atrial fibrillation (HCC) [I48.0]    Discharge Diagnoses:   Patient Active Problem List    Diagnosis Date Noted    Iron deficiency anemia 08/27/2024    Hypertension 11/07/2023    Hyperlipidemia 11/07/2023    Coronary artery disease involving native heart 11/07/2023    Aortic stenosis, mild 11/07/2023    Diastolic dysfunction 11/07/2023    Paroxysmal A-fib (HCC) 11/07/2023       Cardiology Procedures this admission:  Watchman implant  Consults: none    Hospital Course: The patient was seen with concerns for severe anemia with chronic anticoagulation use in the setting of atrial fibrillation.  He was admitted on 11/26/2024 and taken for Watchman procedure by Dr. Ayo Martin.  The procedure was performed with no acute complications.    Patient was transferred to the recovery holding area and monitored.  The patient was seen by Dr. Martin and deemed appropriate for discharge today.  He will follow-up for 45-day post watchman TIM and continue following up with his primary cardiologist Dr. Lyons.    He will continue his Eliquis at discharge.     DISPOSITION: The patient is being discharged home on a low saturated fat, low cholesterol cardiac diet. Pt is instructed to advance activities as tolerated.    Pt is instructed to watch right groin site for bleeding/oozing, if seen Pt is instructed to apply firm pressure with clean cloth and call office at [phone]. Pt is instructed to watch for signs of infection which include increasing area of redness around site, fever/hot to touch or purulent drainage. Pt is instructed not to soak in a tub bath for 1 week, but it is okay to shower.

## 2024-11-26 NOTE — PROGRESS NOTES
TRANSFER - IN REPORT:    Verbal report received from DA Canela on Joaquimashlee Thomas Radhames being received from PACU for routine post-op      Report consisted of patient’s Situation, Background, Assessment and Recommendations(SBAR).     Information from the following report(s) Procedure Summary was reviewed with the receiving nurse.    Opportunity for questions and clarification was provided.      Assessment completed upon patient’s arrival to unit and care assumed.

## 2024-11-26 NOTE — PROGRESS NOTES
LAAO Closure w/ Dr. Martin  Successful Watchman deployment  RFV access x2  16fr sheath removed, closed with Perclose  10fr sheath removed, closed with Perclose    Pt transferred to PACU

## 2024-11-26 NOTE — ANESTHESIA POSTPROCEDURE EVALUATION
Department of Anesthesiology  Postprocedure Note    Patient: Joaquim Ricci  MRN: [id number]  YOB: 1945  Date of evaluation: 11/26/2024    Procedure Summary       Date: 11/26/24 Room / Location: [facility] MAIN OR  / [facility] MAIN OR    Anesthesia Start: 0808 Anesthesia Stop: 0919    Procedure: Watchman almas closure device Diagnosis:       Paroxysmal A-fib (HCC)      (Paroxysmal A-fib (HCC) [I48.0])    Providers: Ayo Martin MD Responsible Provider: Elaina Blake MD    Anesthesia Type: general ASA Status: 3            Anesthesia Type: No value filed.    Brody Phase I: Brody Score: 10    Brody Phase II: Brody Score: 10    Anesthesia Post Evaluation    Patient location during evaluation: PACU  Patient participation: complete - patient participated  Level of consciousness: awake and alert  Airway patency: patent  Nausea & Vomiting: no nausea and no vomiting  Cardiovascular status: hemodynamically stable  Respiratory status: acceptable, nonlabored ventilation and spontaneous ventilation  Hydration status: euvolemic  Comments: BP (!) 127/57   Pulse 63   Temp 97.4 °F (36.3 °C) (Infrared)   Resp 12   Ht 1.702 m (5' 7\")   Wt 81.6 kg (180 lb)   SpO2 94%   BMI 28.19 kg/m²     Multimodal analgesia pain management approach  Pain management: adequate and satisfactory to patient    There were no known notable events for this encounter.

## 2024-11-26 NOTE — ANESTHESIA PROCEDURE NOTES
Airway  Date/Time: 11/26/2024 8:16 AM  Urgency: elective    Airway not difficult    General Information and Staff    Patient location during procedure: OR  Resident/CRNA: Sylvain Grover APRN - CRNA  Performed: resident/CRNA   Performed by: Sylvain Grover APRN - CRNA  Authorized by: Elaina Blake MD      Indications and Patient Condition  Indications for airway management: anesthesia  Spontaneous Ventilation: absent  Sedation level: deep  Preoxygenated: yes  Patient position: sniffing  MILS not maintained throughout  Mask difficulty assessment: difficult bag mask (inadequate, unstable or two providers) +/- NMBA    Final Airway Details  Final airway type: endotracheal airway      Successful airway: ETT  Cuffed: yes   Successful intubation technique: direct laryngoscopy  Facilitating devices/methods: intubating stylet and cricoid pressure  Endotracheal tube insertion site: oral  Blade: Stock  Blade size: #4  ETT size (mm): 7.5  Cormack-Lehane Classification: grade IIa - partial view of glottis  Placement verified by: chest auscultation and capnometry   Measured from: lips  ETT to lips (cm): 21  Number of attempts at approach: 1  Ventilation between attempts: 2 hand mask  Number of other approaches attempted: 0    Additional Comments  Anterior glottis even with stock   no

## 2024-11-27 ENCOUNTER — TELEPHONE (OUTPATIENT)
Dept: CARDIAC CATH/INVASIVE PROCEDURES | Age: 79
End: 2024-11-27

## 2024-11-27 NOTE — TELEPHONE ENCOUNTER
Post Watchman SDD follow up call.     Joaquim Ricci is 24 hours post Watchman device placement and SDD on 11/26/24.  He is doing well w/o complaints.  Patient states that there is some soreness in right groin area, but denies pain, swelling, and bleeding to the right groin. Patient did not take the Eliquis this am.  Patient stated that will take this pm.  Reinforce patient to take Eliquis as prescribed.  Upcoming appts were reviewed.  Patient has WM coordinator contact ([phone]) information for questions or concerns.

## 2024-12-23 DIAGNOSIS — D50.9 IRON DEFICIENCY ANEMIA, UNSPECIFIED IRON DEFICIENCY ANEMIA TYPE: ICD-10-CM

## 2024-12-23 LAB
ALBUMIN SERPL-MCNC: 3.9 G/DL (ref 3.2–4.6)
ALBUMIN/GLOB SERPL: 1.3 (ref 1–1.9)
ALP SERPL-CCNC: 103 U/L (ref 40–129)
ALT SERPL-CCNC: 30 U/L (ref 8–55)
ANION GAP SERPL CALC-SCNC: 11 MMOL/L (ref 7–16)
AST SERPL-CCNC: 27 U/L (ref 15–37)
BASOPHILS # BLD: 0 K/UL (ref 0–0.2)
BASOPHILS NFR BLD: 1 % (ref 0–2)
BILIRUB SERPL-MCNC: 0.3 MG/DL (ref 0–1.2)
BUN SERPL-MCNC: 35 MG/DL (ref 8–23)
CALCIUM SERPL-MCNC: 9.5 MG/DL (ref 8.8–10.2)
CHLORIDE SERPL-SCNC: 105 MMOL/L (ref 98–107)
CO2 SERPL-SCNC: 26 MMOL/L (ref 20–29)
CREAT SERPL-MCNC: 1.5 MG/DL (ref 0.8–1.3)
DIFFERENTIAL METHOD BLD: ABNORMAL
EOSINOPHIL # BLD: 0.1 K/UL (ref 0–0.8)
EOSINOPHIL NFR BLD: 3 % (ref 0.5–7.8)
ERYTHROCYTE [DISTWIDTH] IN BLOOD BY AUTOMATED COUNT: 14.2 % (ref 11.9–14.6)
FERRITIN SERPL-MCNC: 334 NG/ML (ref 8–388)
FOLATE SERPL-MCNC: 18.9 NG/ML (ref 3.1–17.5)
GLOBULIN SER CALC-MCNC: 3 G/DL (ref 2.3–3.5)
GLUCOSE SERPL-MCNC: 203 MG/DL (ref 70–99)
HCT VFR BLD AUTO: 34.7 % (ref 41.1–50.3)
HGB BLD-MCNC: 10.6 G/DL (ref 13.6–17.2)
IMM GRANULOCYTES # BLD AUTO: 0 K/UL (ref 0–0.5)
IMM GRANULOCYTES NFR BLD AUTO: 0 % (ref 0–5)
IRON SATN MFR SERPL: 17 % (ref 20–50)
IRON SERPL-MCNC: 53 UG/DL (ref 35–100)
LYMPHOCYTES # BLD: 0.9 K/UL (ref 0.5–4.6)
LYMPHOCYTES NFR BLD: 23 % (ref 13–44)
MCH RBC QN AUTO: 31 PG (ref 26.1–32.9)
MCHC RBC AUTO-ENTMCNC: 30.5 G/DL (ref 31.4–35)
MCV RBC AUTO: 101.5 FL (ref 82–102)
MONOCYTES # BLD: 0.7 K/UL (ref 0.1–1.3)
MONOCYTES NFR BLD: 16 % (ref 4–12)
NEUTS SEG # BLD: 2.3 K/UL (ref 1.7–8.2)
NEUTS SEG NFR BLD: 57 % (ref 43–78)
NRBC # BLD: 0 K/UL (ref 0–0.2)
PLATELET # BLD AUTO: 164 K/UL (ref 150–450)
PMV BLD AUTO: 10.8 FL (ref 9.4–12.3)
POTASSIUM SERPL-SCNC: 4.7 MMOL/L (ref 3.5–5.1)
PROT SERPL-MCNC: 7 G/DL (ref 6.3–8.2)
RBC # BLD AUTO: 3.42 M/UL (ref 4.23–5.6)
SODIUM SERPL-SCNC: 143 MMOL/L (ref 136–145)
TIBC SERPL-MCNC: 304 UG/DL (ref 240–450)
UIBC SERPL-MCNC: 251 UG/DL (ref 112–347)
VIT B12 SERPL-MCNC: 480 PG/ML (ref 193–986)
WBC # BLD AUTO: 4 K/UL (ref 4.3–11.1)

## 2025-01-02 DIAGNOSIS — D50.9 IRON DEFICIENCY ANEMIA, UNSPECIFIED IRON DEFICIENCY ANEMIA TYPE: Primary | ICD-10-CM

## 2025-01-06 ENCOUNTER — HOSPITAL ENCOUNTER (OUTPATIENT)
Dept: LAB | Age: 80
Discharge: HOME | End: 2025-01-06
Payer: MEDICARE

## 2025-01-06 ENCOUNTER — OFFICE VISIT (OUTPATIENT)
Dept: ONCOLOGY | Age: 80
End: 2025-01-06
Payer: MEDICARE

## 2025-01-06 VITALS
HEART RATE: 86 BPM | OXYGEN SATURATION: 98 % | SYSTOLIC BLOOD PRESSURE: 133 MMHG | HEIGHT: 67 IN | WEIGHT: 189.4 LBS | RESPIRATION RATE: 18 BRPM | TEMPERATURE: 97.8 F | DIASTOLIC BLOOD PRESSURE: 66 MMHG | BODY MASS INDEX: 29.73 KG/M2

## 2025-01-06 DIAGNOSIS — D50.9 IRON DEFICIENCY ANEMIA, UNSPECIFIED IRON DEFICIENCY ANEMIA TYPE: ICD-10-CM

## 2025-01-06 DIAGNOSIS — R53.83 FATIGUE, UNSPECIFIED TYPE: ICD-10-CM

## 2025-01-06 DIAGNOSIS — D75.89 MACROCYTOSIS: ICD-10-CM

## 2025-01-06 DIAGNOSIS — D50.9 IRON DEFICIENCY ANEMIA, UNSPECIFIED IRON DEFICIENCY ANEMIA TYPE: Primary | ICD-10-CM

## 2025-01-06 LAB
ALBUMIN SERPL-MCNC: 3.7 G/DL (ref 3.2–4.6)
ALBUMIN/GLOB SERPL: 1.2 (ref 1–1.9)
ALP SERPL-CCNC: 89 U/L (ref 40–129)
ALT SERPL-CCNC: 24 U/L (ref 8–55)
ANION GAP SERPL CALC-SCNC: 10 MMOL/L (ref 7–16)
AST SERPL-CCNC: 24 U/L (ref 15–37)
BASOPHILS # BLD: 0 K/UL (ref 0–0.2)
BASOPHILS NFR BLD: 1 % (ref 0–2)
BILIRUB SERPL-MCNC: <0.2 MG/DL (ref 0–1.2)
BUN SERPL-MCNC: 35 MG/DL (ref 8–23)
CALCIUM SERPL-MCNC: 9 MG/DL (ref 8.8–10.2)
CHLORIDE SERPL-SCNC: 107 MMOL/L (ref 98–107)
CO2 SERPL-SCNC: 24 MMOL/L (ref 20–29)
CREAT SERPL-MCNC: 1.76 MG/DL (ref 0.8–1.3)
DIFFERENTIAL METHOD BLD: ABNORMAL
EOSINOPHIL # BLD: 0.1 K/UL (ref 0–0.8)
EOSINOPHIL NFR BLD: 2 % (ref 0.5–7.8)
ERYTHROCYTE [DISTWIDTH] IN BLOOD BY AUTOMATED COUNT: 15.3 % (ref 11.9–14.6)
GLOBULIN SER CALC-MCNC: 3.1 G/DL (ref 2.3–3.5)
GLUCOSE SERPL-MCNC: 157 MG/DL (ref 70–99)
HCT VFR BLD AUTO: 28.6 % (ref 41.1–50.3)
HGB BLD-MCNC: 8.8 G/DL (ref 13.6–17.2)
IMM GRANULOCYTES # BLD AUTO: 0 K/UL (ref 0–0.5)
IMM GRANULOCYTES NFR BLD AUTO: 0 % (ref 0–5)
LYMPHOCYTES # BLD: 1 K/UL (ref 0.5–4.6)
LYMPHOCYTES NFR BLD: 20 % (ref 13–44)
MCH RBC QN AUTO: 31.7 PG (ref 26.1–32.9)
MCHC RBC AUTO-ENTMCNC: 30.8 G/DL (ref 31.4–35)
MCV RBC AUTO: 102.9 FL (ref 82–102)
MONOCYTES # BLD: 0.5 K/UL (ref 0.1–1.3)
MONOCYTES NFR BLD: 10 % (ref 4–12)
NEUTS SEG # BLD: 3.3 K/UL (ref 1.7–8.2)
NEUTS SEG NFR BLD: 67 % (ref 43–78)
NRBC # BLD: 0 K/UL (ref 0–0.2)
PLATELET # BLD AUTO: 195 K/UL (ref 150–450)
PMV BLD AUTO: 9.9 FL (ref 9.4–12.3)
POTASSIUM SERPL-SCNC: 4.4 MMOL/L (ref 3.5–5.1)
PROT SERPL-MCNC: 6.8 G/DL (ref 6.3–8.2)
RBC # BLD AUTO: 2.78 M/UL (ref 4.23–5.6)
SODIUM SERPL-SCNC: 141 MMOL/L (ref 136–145)
WBC # BLD AUTO: 4.9 K/UL (ref 4.3–11.1)

## 2025-01-06 PROCEDURE — 80053 COMPREHEN METABOLIC PANEL: CPT

## 2025-01-06 PROCEDURE — G8427 DOCREV CUR MEDS BY ELIG CLIN: HCPCS | Performed by: INTERNAL MEDICINE

## 2025-01-06 PROCEDURE — 85025 COMPLETE CBC W/AUTO DIFF WBC: CPT

## 2025-01-06 PROCEDURE — 1123F ACP DISCUSS/DSCN MKR DOCD: CPT | Performed by: INTERNAL MEDICINE

## 2025-01-06 PROCEDURE — 1159F MED LIST DOCD IN RCRD: CPT | Performed by: INTERNAL MEDICINE

## 2025-01-06 PROCEDURE — 1160F RVW MEDS BY RX/DR IN RCRD: CPT | Performed by: INTERNAL MEDICINE

## 2025-01-06 PROCEDURE — 99215 OFFICE O/P EST HI 40 MIN: CPT | Performed by: INTERNAL MEDICINE

## 2025-01-06 PROCEDURE — 3075F SYST BP GE 130 - 139MM HG: CPT | Performed by: INTERNAL MEDICINE

## 2025-01-06 PROCEDURE — M1308 PR FLU IMMUNIZE NO ADMIN: HCPCS | Performed by: INTERNAL MEDICINE

## 2025-01-06 PROCEDURE — 36415 COLL VENOUS BLD VENIPUNCTURE: CPT

## 2025-01-06 PROCEDURE — 3078F DIAST BP <80 MM HG: CPT | Performed by: INTERNAL MEDICINE

## 2025-01-06 PROCEDURE — 1126F AMNT PAIN NOTED NONE PRSNT: CPT | Performed by: INTERNAL MEDICINE

## 2025-01-06 PROCEDURE — 1036F TOBACCO NON-USER: CPT | Performed by: INTERNAL MEDICINE

## 2025-01-06 PROCEDURE — G8417 CALC BMI ABV UP PARAM F/U: HCPCS | Performed by: INTERNAL MEDICINE

## 2025-01-06 RX ORDER — ONDANSETRON 2 MG/ML
8 INJECTION INTRAMUSCULAR; INTRAVENOUS
OUTPATIENT
Start: 2025-01-13

## 2025-01-06 RX ORDER — SODIUM CHLORIDE 9 MG/ML
5-250 INJECTION, SOLUTION INTRAVENOUS PRN
OUTPATIENT
Start: 2025-01-13

## 2025-01-06 RX ORDER — HEPARIN SODIUM (PORCINE) LOCK FLUSH IV SOLN 100 UNIT/ML 100 UNIT/ML
500 SOLUTION INTRAVENOUS PRN
OUTPATIENT
Start: 2025-01-13

## 2025-01-06 RX ORDER — SODIUM CHLORIDE 9 MG/ML
INJECTION, SOLUTION INTRAVENOUS CONTINUOUS
OUTPATIENT
Start: 2025-01-13

## 2025-01-06 RX ORDER — SODIUM CHLORIDE 0.9 % (FLUSH) 0.9 %
5-40 SYRINGE (ML) INJECTION PRN
OUTPATIENT
Start: 2025-01-13

## 2025-01-06 RX ORDER — EPINEPHRINE 1 MG/ML
0.3 INJECTION, SOLUTION, CONCENTRATE INTRAVENOUS PRN
OUTPATIENT
Start: 2025-01-13

## 2025-01-06 RX ORDER — FAMOTIDINE 10 MG/ML
20 INJECTION, SOLUTION INTRAVENOUS
OUTPATIENT
Start: 2025-01-13

## 2025-01-06 RX ORDER — DIPHENHYDRAMINE HYDROCHLORIDE 50 MG/ML
50 INJECTION INTRAMUSCULAR; INTRAVENOUS
OUTPATIENT
Start: 2025-01-13

## 2025-01-06 RX ORDER — ACETAMINOPHEN 325 MG/1
650 TABLET ORAL
OUTPATIENT
Start: 2025-01-13

## 2025-01-06 RX ORDER — ALBUTEROL SULFATE 90 UG/1
4 INHALANT RESPIRATORY (INHALATION) PRN
OUTPATIENT
Start: 2025-01-13

## 2025-01-06 RX ORDER — HYDROCORTISONE SODIUM SUCCINATE 100 MG/2ML
100 INJECTION INTRAMUSCULAR; INTRAVENOUS
OUTPATIENT
Start: 2025-01-13

## 2025-01-06 ASSESSMENT — PATIENT HEALTH QUESTIONNAIRE - PHQ9
SUM OF ALL RESPONSES TO PHQ QUESTIONS 1-9: 1
SUM OF ALL RESPONSES TO PHQ QUESTIONS 1-9: 1
1. LITTLE INTEREST OR PLEASURE IN DOING THINGS: NOT AT ALL
SUM OF ALL RESPONSES TO PHQ QUESTIONS 1-9: 1
2. FEELING DOWN, DEPRESSED OR HOPELESS: SEVERAL DAYS
SUM OF ALL RESPONSES TO PHQ QUESTIONS 1-9: 1
SUM OF ALL RESPONSES TO PHQ9 QUESTIONS 1 & 2: 1

## 2025-01-06 NOTE — PATIENT INSTRUCTIONS
Room visits within 24 hours of discharge.    -------------------------------------------------------------------------------------------------------------------  Please call our office at [phone] if you have any  of the following symptoms:   Fever of 100.5 or greater  Chills  Shortness of breath  Swelling or pain in one leg    After office hours an answering service is available and will contact a provider for emergencies or if you are experiencing any of the above symptoms.    ANKUR HAQ RN

## 2025-01-06 NOTE — PROGRESS NOTES
[facility]: Office Visit Progress Note    Chief Complaint:    Anemia    History of Present Illness:  Referral Diagnosis: Anemia, unspecified type      Referring Provider: Armaan Lyons MD     Primary Care Provider: Sharan Aguilar DO       Family History of Cancer/ Hematology Disorders: No known family history     Presenting Symptoms: Anemia, unspecified type     79 y.o. white male     PMH: DM II, ZAINAB, PAF, CKD3a, Dyslipidemia, Essential HTN, BPH, Vit D deficiency, Class 1 Obesity     9/4/24 - Met with Cardiologist, Armaan Lyons MD ([facility])  Here for f/u for HTN, HLD, and to review ECHO results  History of Present Illness  Patient presents with SOB and he was found to have diminished Hgb. Iron infusion therapies are recommended additionally we recommended following up with GI.  Interval History:  6/23/2022 previous history of coronary artery disease status post coronary artery bypass grafting in 1985 PCI in 2014.  Additional history of paroxysmal atrial fibrillation.  Carotid studies performed in the past with mildly elevated velocities.       Cardiac History:  1.Coronary artery bypass grafting in 1985  2.PCI 2014  3.2018 cardioversion  4.Echocardiogram 5/2020  5.         1/2022 echocardiogram [facility] ejection fraction 55 to 65% mild aortic stenosis.  Grade 2 diastolic dysfunction  6.6/28/2023 EKG: sinus rhythm, normal rate, normal NC intervals, nonspecific ST abnormality  7.2/22/2024 EKG sinus bradycardia with rate variation  8.8/19/2024 EKG sinus bradycardia PACs.  9.8/22/2024 echocardiogram ejection fraction 79% moderate aortic stenosis with mean gradient 29 mmHg moderate mitral regurgitation     Abnormal labs (9/4/24 - [facility])  WBC - 4.1                   RBC - 2.83                 Hgb - 9.0                    Hct - 30.0  MCV - 106.0               MCHC - 30.0              Mono % - 13  Assessment:  1.         Anemia - Plan for GI workup iron infusion therapy.  Possible candidate for

## 2025-01-09 ENCOUNTER — OFFICE VISIT (OUTPATIENT)
Age: 80
End: 2025-01-09
Payer: MEDICARE

## 2025-01-09 VITALS
WEIGHT: 189 LBS | DIASTOLIC BLOOD PRESSURE: 64 MMHG | HEART RATE: 84 BPM | BODY MASS INDEX: 29.66 KG/M2 | HEIGHT: 67 IN | SYSTOLIC BLOOD PRESSURE: 118 MMHG

## 2025-01-09 DIAGNOSIS — D50.9 IRON DEFICIENCY ANEMIA, UNSPECIFIED IRON DEFICIENCY ANEMIA TYPE: ICD-10-CM

## 2025-01-09 DIAGNOSIS — I25.10 ASCVD (ARTERIOSCLEROTIC CARDIOVASCULAR DISEASE): ICD-10-CM

## 2025-01-09 DIAGNOSIS — I48.0 PAROXYSMAL ATRIAL FIBRILLATION (HCC): Primary | ICD-10-CM

## 2025-01-09 PROCEDURE — G8428 CUR MEDS NOT DOCUMENT: HCPCS | Performed by: INTERNAL MEDICINE

## 2025-01-09 PROCEDURE — 1123F ACP DISCUSS/DSCN MKR DOCD: CPT | Performed by: INTERNAL MEDICINE

## 2025-01-09 PROCEDURE — 3074F SYST BP LT 130 MM HG: CPT | Performed by: INTERNAL MEDICINE

## 2025-01-09 PROCEDURE — 1036F TOBACCO NON-USER: CPT | Performed by: INTERNAL MEDICINE

## 2025-01-09 PROCEDURE — 99214 OFFICE O/P EST MOD 30 MIN: CPT | Performed by: INTERNAL MEDICINE

## 2025-01-09 PROCEDURE — G8417 CALC BMI ABV UP PARAM F/U: HCPCS | Performed by: INTERNAL MEDICINE

## 2025-01-09 PROCEDURE — 3078F DIAST BP <80 MM HG: CPT | Performed by: INTERNAL MEDICINE

## 2025-01-09 PROCEDURE — 1126F AMNT PAIN NOTED NONE PRSNT: CPT | Performed by: INTERNAL MEDICINE

## 2025-01-09 NOTE — PROGRESS NOTES
[address], [address]  [place] [address]  PHONE: [phone]    SUBJECTIVE:   Joaquim Ricci is a 79 y.o. male 1945   seen for a follow up visit regarding the following:     Chief Complaint   Patient presents with    Irregular Heart Beat    Hyperlipidemia         History of Present Illness  The patient is an 79-year-old male with a medical history significant for anemia, aortic stenosis, atrial fibrillation, and atherosclerotic cardiovascular disease (ASCVD).    The patient underwent a Watchman device implantation on November 26, 2024, utilizing a 24 mm Flex Pro device. A transesophageal echocardiogram performed on the same day revealed an ejection fraction (EF) of 60-65% and mild to moderate aortic stenosis.    The patient has been diagnosed with iron deficiency anemia. His primary care physician has recommended an additional course of iron infusions. Previous endoscopic and colonoscopic evaluations revealed no abnormalities. A capsule endoscopy is being considered for further diagnostic investigation.        Interval history:  6/23/2022 previous history of coronary artery disease status post coronary artery bypass grafting in 1985 PCI in 2014.  Additional history of paroxysmal atrial fibrillation.  Carotid studies performed in the past with mildly elevated velocities.       Cardiac History:  Coronary artery bypass grafting in 1985  PCI 2014 2018 cardioversion  Echocardiogram 5/2020 1/2022 echocardiogram [facility] ejection fraction 55 to 65% mild aortic stenosis.  Grade 2 diastolic dysfunction  6/28/2023 EKG: sinus rhythm, normal rate, normal CT intervals, nonspecific ST abnormality  2/22/2024 EKG sinus bradycardia with rate variation  8/19/2024 EKG sinus bradycardia PACs.  8/22/2024 echocardiogram ejection fraction 79% moderate aortic stenosis with mean gradient 29 mmHg moderate mitral regurgitation     Results  - Laboratory Studies:    - Hemoglobin:

## 2025-01-13 NOTE — PROGRESS NOTES
Patient pre-assessment complete for TIM scheduled for Dr Smith, arrival time 0830. Patient verified using . NPO status reinforced.  Patient instructed to HOLD Lasix. Instructed they can take all other medications excluding vitamins & supplements. Patient verbalizes understanding of all instructions & denies any questions at this time.

## 2025-01-14 ENCOUNTER — HOSPITAL ENCOUNTER (OUTPATIENT)
Dept: INFUSION THERAPY | Age: 80
Setting detail: INFUSION SERIES
End: 2025-01-14
Payer: MEDICARE

## 2025-01-14 ENCOUNTER — HOSPITAL ENCOUNTER (OUTPATIENT)
Dept: INFUSION THERAPY | Age: 80
Setting detail: INFUSION SERIES
Discharge: HOME | End: 2025-01-14
Payer: MEDICARE

## 2025-01-14 ENCOUNTER — HOSPITAL ENCOUNTER (OUTPATIENT)
Dept: CARDIAC CATH/INVASIVE PROCEDURES | Age: 80
Discharge: HOME | End: 2025-01-14
Attending: INTERNAL MEDICINE | Admitting: INTERNAL MEDICINE
Payer: MEDICARE

## 2025-01-14 VITALS
OXYGEN SATURATION: 95 % | DIASTOLIC BLOOD PRESSURE: 69 MMHG | RESPIRATION RATE: 18 BRPM | SYSTOLIC BLOOD PRESSURE: 117 MMHG | HEART RATE: 95 BPM | TEMPERATURE: 98 F

## 2025-01-14 VITALS
SYSTOLIC BLOOD PRESSURE: 121 MMHG | WEIGHT: 185 LBS | TEMPERATURE: 98 F | HEART RATE: 88 BPM | HEIGHT: 67 IN | BODY MASS INDEX: 29.03 KG/M2 | DIASTOLIC BLOOD PRESSURE: 64 MMHG | RESPIRATION RATE: 18 BRPM | OXYGEN SATURATION: 96 %

## 2025-01-14 DIAGNOSIS — I48.0 PAROXYSMAL A-FIB (HCC): ICD-10-CM

## 2025-01-14 LAB
ANION GAP SERPL CALC-SCNC: 12 MMOL/L (ref 7–16)
BUN SERPL-MCNC: 40 MG/DL (ref 8–23)
CALCIUM SERPL-MCNC: 8.8 MG/DL (ref 8.8–10.2)
CHLORIDE SERPL-SCNC: 104 MMOL/L (ref 98–107)
CO2 SERPL-SCNC: 23 MMOL/L (ref 20–29)
CREAT SERPL-MCNC: 1.64 MG/DL (ref 0.8–1.3)
EKG DIAGNOSIS: NORMAL
EKG Q-T INTERVAL: 416 MS
EKG QRS DURATION: 88 MS
EKG QTC CALCULATION (BAZETT): 500 MS
EKG R AXIS: 53 DEGREES
EKG T AXIS: 67 DEGREES
EKG VENTRICULAR RATE: 87 BPM
ERYTHROCYTE [DISTWIDTH] IN BLOOD BY AUTOMATED COUNT: 15.3 % (ref 11.9–14.6)
GLUCOSE SERPL-MCNC: 159 MG/DL (ref 70–99)
HCT VFR BLD AUTO: 25.1 % (ref 41.1–50.3)
HGB BLD-MCNC: 7.6 G/DL (ref 13.6–17.2)
HISTORY CHECK: NORMAL
MCH RBC QN AUTO: 30.8 PG (ref 26.1–32.9)
MCHC RBC AUTO-ENTMCNC: 30.3 G/DL (ref 31.4–35)
MCV RBC AUTO: 101.6 FL (ref 82–102)
NRBC # BLD: 0 K/UL (ref 0–0.2)
PLATELET # BLD AUTO: 183 K/UL (ref 150–450)
PMV BLD AUTO: 10.3 FL (ref 9.4–12.3)
POTASSIUM SERPL-SCNC: 4.5 MMOL/L (ref 3.5–5.1)
RBC # BLD AUTO: 2.47 M/UL (ref 4.23–5.6)
SODIUM SERPL-SCNC: 139 MMOL/L (ref 136–145)
WBC # BLD AUTO: 4.7 K/UL (ref 4.3–11.1)

## 2025-01-14 PROCEDURE — 36430 TRANSFUSION BLD/BLD COMPNT: CPT

## 2025-01-14 PROCEDURE — 86850 RBC ANTIBODY SCREEN: CPT

## 2025-01-14 PROCEDURE — 85027 COMPLETE CBC AUTOMATED: CPT

## 2025-01-14 PROCEDURE — 2500000003 HC RX 250 WO HCPCS: Performed by: INTERNAL MEDICINE

## 2025-01-14 PROCEDURE — 93005 ELECTROCARDIOGRAM TRACING: CPT | Performed by: INTERNAL MEDICINE

## 2025-01-14 PROCEDURE — 86923 COMPATIBILITY TEST ELECTRIC: CPT

## 2025-01-14 PROCEDURE — P9016 RBC LEUKOCYTES REDUCED: HCPCS

## 2025-01-14 PROCEDURE — 93010 ELECTROCARDIOGRAM REPORT: CPT | Performed by: INTERNAL MEDICINE

## 2025-01-14 PROCEDURE — 86901 BLOOD TYPING SEROLOGIC RH(D): CPT

## 2025-01-14 PROCEDURE — 86900 BLOOD TYPING SEROLOGIC ABO: CPT

## 2025-01-14 PROCEDURE — 80048 BASIC METABOLIC PNL TOTAL CA: CPT

## 2025-01-14 RX ORDER — SODIUM CHLORIDE 0.9 % (FLUSH) 0.9 %
5-40 SYRINGE (ML) INJECTION PRN
Status: DISCONTINUED | OUTPATIENT
Start: 2025-01-14 | End: 2025-01-15 | Stop reason: HOSPADM

## 2025-01-14 RX ORDER — SODIUM CHLORIDE 9 MG/ML
INJECTION, SOLUTION INTRAVENOUS PRN
OUTPATIENT
Start: 2025-01-14

## 2025-01-14 RX ADMIN — SODIUM CHLORIDE, PRESERVATIVE FREE 10 ML: 5 INJECTION INTRAVENOUS at 14:48

## 2025-01-14 RX ADMIN — SODIUM CHLORIDE, PRESERVATIVE FREE 30 ML: 5 INJECTION INTRAVENOUS at 15:03

## 2025-01-14 NOTE — PROGRESS NOTES
Arrived to the Infusion Center.  1 unit PRBCs completed. Patient tolerated well.   Any issues or concerns during appointment: none.  Patient aware of next infusion appointment on 1/21 (date) at 2:15 PM (time).  Patient instructed to call provider with temperature of 100.4 or greater or nausea/vomiting/ diarrhea or pain not controlled by medications  Discharged ambulatory.

## 2025-01-14 NOTE — PROGRESS NOTES
Hgb 7.6. Dr. Smith at bedside. Procedure cancelled. Patient scheduled for outpatient blood transfusion. Will reschedule for Friday 1/17/24.    Patient discharged from hospital.

## 2025-01-14 NOTE — CONSENT
Informed Consent for Blood Component Transfusion Note    I have discussed with the patient the rationale for blood component transfusion; its benefits in treating or preventing fatigue, organ damage, or death; and its risk which includes mild transfusion reactions, rare risk of blood borne infection, or more serious but rare reactions. I have discussed the alternatives to transfusion, including the risk and consequences of not receiving transfusion. The patient had an opportunity to ask questions and had agreed to proceed with transfusion of blood components.    Electronically signed by Chet Smith DO on 1/14/25 at 10:32 AM EST

## 2025-01-14 NOTE — PROGRESS NOTES
CARDIOLOGY:  Patient presented today for outpatient transesophageal echocardiogram (TIM) post watchman implantation.  Hemoglobin found to be 7.6.  Which was significantly lower than previous hemoglobin checks *8.8, 10.6).  Discussed low hemoglobin today with the patient, he is scheduled this afternoon for iron transfusion at the cancer center.  Will have him undergo 1 unit PRBC infusions in effort to improve his hemoglobin prior to elective transesophageal echocardiogram.  He will be rescheduled later this week for TIM procedure.  Discussed with the patient and his spouse today including risks, benefits, alternatives.  They are agreeable to the plan.  Arrangements made for transfusion this afternoon with plans close cancer center.  Procedure canceled for today.

## 2025-01-14 NOTE — PROGRESS NOTES
Patient received to CPRU room # 14  Ambulatory from [facility]. Patient scheduled for TIM today with Dr Carreno. Procedure reviewed & questions answered, voiced good understanding consent obtained & placed on chart. All medications and medical history reviewed. Will prep patient per orders. Patient & family updated on plan of care.      The patient has a fraility score of 4-VULNERABLE, based on ambulation.

## 2025-01-15 LAB
ABO + RH BLD: NORMAL
BLD PROD TYP BPU: NORMAL
BLOOD BANK BLOOD PRODUCT EXPIRATION DATE: NORMAL
BLOOD BANK DISPENSE STATUS: NORMAL
BLOOD BANK ISBT PRODUCT BLOOD TYPE: 5100
BLOOD BANK PRODUCT CODE: NORMAL
BLOOD BANK UNIT TYPE AND RH: NORMAL
BLOOD GROUP ANTIBODIES SERPL: NORMAL
BPU ID: NORMAL
CROSSMATCH RESULT: NORMAL
SPECIMEN EXP DATE BLD: NORMAL
UNIT DIVISION: 0
UNIT ISSUE DATE/TIME: NORMAL

## 2025-01-16 ENCOUNTER — HOSPITAL ENCOUNTER (OUTPATIENT)
Dept: INFUSION THERAPY | Age: 80
Setting detail: INFUSION SERIES
Discharge: HOME | End: 2025-01-16
Payer: MEDICARE

## 2025-01-16 VITALS
DIASTOLIC BLOOD PRESSURE: 70 MMHG | HEART RATE: 92 BPM | SYSTOLIC BLOOD PRESSURE: 128 MMHG | TEMPERATURE: 97.4 F | RESPIRATION RATE: 16 BRPM | OXYGEN SATURATION: 97 %

## 2025-01-16 DIAGNOSIS — D50.9 IRON DEFICIENCY ANEMIA, UNSPECIFIED IRON DEFICIENCY ANEMIA TYPE: Primary | ICD-10-CM

## 2025-01-16 PROCEDURE — 96365 THER/PROPH/DIAG IV INF INIT: CPT

## 2025-01-16 PROCEDURE — 6360000002 HC RX W HCPCS: Performed by: INTERNAL MEDICINE

## 2025-01-16 PROCEDURE — 2500000003 HC RX 250 WO HCPCS: Performed by: INTERNAL MEDICINE

## 2025-01-16 PROCEDURE — 2580000003 HC RX 258: Performed by: INTERNAL MEDICINE

## 2025-01-16 RX ORDER — EPINEPHRINE 1 MG/ML
0.3 INJECTION, SOLUTION, CONCENTRATE INTRAVENOUS PRN
Status: CANCELLED | OUTPATIENT
Start: 2025-01-23

## 2025-01-16 RX ORDER — SODIUM CHLORIDE 9 MG/ML
INJECTION, SOLUTION INTRAVENOUS CONTINUOUS
OUTPATIENT
Start: 2025-01-23

## 2025-01-16 RX ORDER — HYDROCORTISONE SODIUM SUCCINATE 100 MG/2ML
100 INJECTION INTRAMUSCULAR; INTRAVENOUS
Status: CANCELLED | OUTPATIENT
Start: 2025-01-23

## 2025-01-16 RX ORDER — HEPARIN 100 UNIT/ML
500 SYRINGE INTRAVENOUS PRN
OUTPATIENT
Start: 2025-01-23

## 2025-01-16 RX ORDER — SODIUM CHLORIDE 9 MG/ML
5-250 INJECTION, SOLUTION INTRAVENOUS PRN
OUTPATIENT
Start: 2025-01-16

## 2025-01-16 RX ORDER — SODIUM CHLORIDE 0.9 % (FLUSH) 0.9 %
5-40 SYRINGE (ML) INJECTION PRN
Status: DISCONTINUED | OUTPATIENT
Start: 2025-01-16 | End: 2025-01-17 | Stop reason: HOSPADM

## 2025-01-16 RX ORDER — ALBUTEROL SULFATE 90 UG/1
4 INHALANT RESPIRATORY (INHALATION) PRN
Status: CANCELLED | OUTPATIENT
Start: 2025-01-23

## 2025-01-16 RX ORDER — SODIUM CHLORIDE 0.9 % (FLUSH) 0.9 %
5-40 SYRINGE (ML) INJECTION PRN
OUTPATIENT
Start: 2025-01-16

## 2025-01-16 RX ORDER — DIPHENHYDRAMINE HYDROCHLORIDE 50 MG/ML
50 INJECTION INTRAMUSCULAR; INTRAVENOUS
OUTPATIENT
Start: 2025-01-16

## 2025-01-16 RX ORDER — ONDANSETRON 2 MG/ML
8 INJECTION INTRAMUSCULAR; INTRAVENOUS
OUTPATIENT
Start: 2025-01-16

## 2025-01-16 RX ORDER — SODIUM CHLORIDE 9 MG/ML
INJECTION, SOLUTION INTRAVENOUS CONTINUOUS
OUTPATIENT
Start: 2025-01-16

## 2025-01-16 RX ORDER — SODIUM CHLORIDE 9 MG/ML
5-250 INJECTION, SOLUTION INTRAVENOUS PRN
OUTPATIENT
Start: 2025-01-23

## 2025-01-16 RX ORDER — ALBUTEROL SULFATE 90 UG/1
4 INHALANT RESPIRATORY (INHALATION) PRN
OUTPATIENT
Start: 2025-01-16

## 2025-01-16 RX ORDER — HEPARIN 100 UNIT/ML
500 SYRINGE INTRAVENOUS PRN
OUTPATIENT
Start: 2025-01-16

## 2025-01-16 RX ORDER — SODIUM CHLORIDE 0.9 % (FLUSH) 0.9 %
5-40 SYRINGE (ML) INJECTION PRN
Status: CANCELLED | OUTPATIENT
Start: 2025-01-23

## 2025-01-16 RX ORDER — ACETAMINOPHEN 325 MG/1
650 TABLET ORAL
OUTPATIENT
Start: 2025-01-16

## 2025-01-16 RX ORDER — HYDROCORTISONE SODIUM SUCCINATE 100 MG/2ML
100 INJECTION INTRAMUSCULAR; INTRAVENOUS
OUTPATIENT
Start: 2025-01-16

## 2025-01-16 RX ORDER — ONDANSETRON 2 MG/ML
8 INJECTION INTRAMUSCULAR; INTRAVENOUS
Status: CANCELLED | OUTPATIENT
Start: 2025-01-23

## 2025-01-16 RX ORDER — ACETAMINOPHEN 325 MG/1
650 TABLET ORAL
Status: CANCELLED | OUTPATIENT
Start: 2025-01-23

## 2025-01-16 RX ORDER — DIPHENHYDRAMINE HYDROCHLORIDE 50 MG/ML
50 INJECTION INTRAMUSCULAR; INTRAVENOUS
Status: CANCELLED | OUTPATIENT
Start: 2025-01-23

## 2025-01-16 RX ORDER — EPINEPHRINE 1 MG/ML
0.3 INJECTION, SOLUTION, CONCENTRATE INTRAVENOUS PRN
OUTPATIENT
Start: 2025-01-16

## 2025-01-16 RX ADMIN — SODIUM CHLORIDE, PRESERVATIVE FREE 10 ML: 5 INJECTION INTRAVENOUS at 13:11

## 2025-01-16 RX ADMIN — FERUMOXYTOL 510 MG: 510 INJECTION INTRAVENOUS at 13:19

## 2025-01-16 NOTE — PROGRESS NOTES
Arrived to the Infusion Center.  Feraheme infusion completed.  Patient tolerated well.   Any issues or concerns during appointment: none.  Patient aware of next infusion appointment on 01/23/2025 (date) at 0900 (time).  Discharged ambulatory.

## 2025-01-16 NOTE — PROGRESS NOTES
Patient pre-assessment complete for TIM scheduled for 0900, arrival time 0800. Patient verified using . Patient instructed to bring a list of all home medications on the day of procedure. NPO status reinforced. Patient instructed to HOLD lasix. Instructed they can take all other medications excluding vitamins & supplements. Patient verbalizes understanding of all instructions & denies any questions at this time.

## 2025-01-17 ENCOUNTER — HOSPITAL ENCOUNTER (OUTPATIENT)
Dept: CARDIAC CATH/INVASIVE PROCEDURES | Age: 80
Discharge: HOME | End: 2025-01-17
Attending: INTERNAL MEDICINE | Admitting: INTERNAL MEDICINE
Payer: MEDICARE

## 2025-01-17 VITALS
DIASTOLIC BLOOD PRESSURE: 68 MMHG | WEIGHT: 190 LBS | TEMPERATURE: 98 F | SYSTOLIC BLOOD PRESSURE: 119 MMHG | HEART RATE: 70 BPM | OXYGEN SATURATION: 98 % | HEIGHT: 67 IN | BODY MASS INDEX: 29.82 KG/M2

## 2025-01-17 DIAGNOSIS — I48.0 PAROXYSMAL A-FIB (HCC): Primary | ICD-10-CM

## 2025-01-17 LAB
ANION GAP SERPL CALC-SCNC: 10 MMOL/L (ref 7–16)
BUN SERPL-MCNC: 38 MG/DL (ref 8–23)
CALCIUM SERPL-MCNC: 9.2 MG/DL (ref 8.8–10.2)
CHLORIDE SERPL-SCNC: 106 MMOL/L (ref 98–107)
CO2 SERPL-SCNC: 25 MMOL/L (ref 20–29)
CREAT SERPL-MCNC: 1.54 MG/DL (ref 0.8–1.3)
ECHO BSA: 2.02 M2
EKG DIAGNOSIS: NORMAL
EKG Q-T INTERVAL: 414 MS
EKG QRS DURATION: 90 MS
EKG QTC CALCULATION (BAZETT): 489 MS
EKG R AXIS: 83 DEGREES
EKG T AXIS: 49 DEGREES
EKG VENTRICULAR RATE: 84 BPM
ERYTHROCYTE [DISTWIDTH] IN BLOOD BY AUTOMATED COUNT: 16.5 % (ref 11.9–14.6)
GLUCOSE SERPL-MCNC: 147 MG/DL (ref 70–99)
HCT VFR BLD AUTO: 25.8 % (ref 41.1–50.3)
HGB BLD-MCNC: 8.1 G/DL (ref 13.6–17.2)
MAGNESIUM SERPL-MCNC: 2.4 MG/DL (ref 1.8–2.4)
MCH RBC QN AUTO: 31.6 PG (ref 26.1–32.9)
MCHC RBC AUTO-ENTMCNC: 31.4 G/DL (ref 31.4–35)
MCV RBC AUTO: 100.8 FL (ref 82–102)
NRBC # BLD: 0 K/UL (ref 0–0.2)
PLATELET # BLD AUTO: 168 K/UL (ref 150–450)
PMV BLD AUTO: 10 FL (ref 9.4–12.3)
POTASSIUM SERPL-SCNC: 4.6 MMOL/L (ref 3.5–5.1)
RBC # BLD AUTO: 2.56 M/UL (ref 4.23–5.6)
SODIUM SERPL-SCNC: 140 MMOL/L (ref 136–145)
WBC # BLD AUTO: 4.1 K/UL (ref 4.3–11.1)

## 2025-01-17 PROCEDURE — 93320 DOPPLER ECHO COMPLETE: CPT | Performed by: INTERNAL MEDICINE

## 2025-01-17 PROCEDURE — 93319 3D ECHO IMG CGEN CAR ANOMAL: CPT | Performed by: INTERNAL MEDICINE

## 2025-01-17 PROCEDURE — 83735 ASSAY OF MAGNESIUM: CPT

## 2025-01-17 PROCEDURE — 93312 ECHO TRANSESOPHAGEAL: CPT | Performed by: INTERNAL MEDICINE

## 2025-01-17 PROCEDURE — 93010 ELECTROCARDIOGRAM REPORT: CPT | Performed by: INTERNAL MEDICINE

## 2025-01-17 PROCEDURE — 6370000000 HC RX 637 (ALT 250 FOR IP): Performed by: INTERNAL MEDICINE

## 2025-01-17 PROCEDURE — 99152 MOD SED SAME PHYS/QHP 5/>YRS: CPT

## 2025-01-17 PROCEDURE — 80048 BASIC METABOLIC PNL TOTAL CA: CPT

## 2025-01-17 PROCEDURE — 93005 ELECTROCARDIOGRAM TRACING: CPT | Performed by: INTERNAL MEDICINE

## 2025-01-17 PROCEDURE — 93320 DOPPLER ECHO COMPLETE: CPT

## 2025-01-17 PROCEDURE — 99152 MOD SED SAME PHYS/QHP 5/>YRS: CPT | Performed by: INTERNAL MEDICINE

## 2025-01-17 PROCEDURE — 6360000002 HC RX W HCPCS: Performed by: INTERNAL MEDICINE

## 2025-01-17 PROCEDURE — 85027 COMPLETE CBC AUTOMATED: CPT

## 2025-01-17 RX ORDER — MIDAZOLAM HYDROCHLORIDE 1 MG/ML
INJECTION, SOLUTION INTRAMUSCULAR; INTRAVENOUS PRN
Status: COMPLETED | OUTPATIENT
Start: 2025-01-17 | End: 2025-01-17

## 2025-01-17 RX ORDER — LIDOCAINE HYDROCHLORIDE 20 MG/ML
SOLUTION OROPHARYNGEAL PRN
Status: COMPLETED | OUTPATIENT
Start: 2025-01-17 | End: 2025-01-17

## 2025-01-17 RX ORDER — ASPIRIN 81 MG/1
81 TABLET ORAL DAILY
Qty: 90 TABLET | Refills: 1 | Status: SHIPPED | OUTPATIENT
Start: 2025-01-17

## 2025-01-17 RX ORDER — FENTANYL CITRATE 50 UG/ML
INJECTION, SOLUTION INTRAMUSCULAR; INTRAVENOUS PRN
Status: COMPLETED | OUTPATIENT
Start: 2025-01-17 | End: 2025-01-17

## 2025-01-17 RX ORDER — SODIUM CHLORIDE 9 MG/ML
INJECTION, SOLUTION INTRAVENOUS CONTINUOUS
Status: DISCONTINUED | OUTPATIENT
Start: 2025-01-17 | End: 2025-01-17 | Stop reason: HOSPADM

## 2025-01-17 RX ORDER — CLOPIDOGREL BISULFATE 75 MG/1
75 TABLET ORAL DAILY
Qty: 90 TABLET | Refills: 1 | Status: SHIPPED | OUTPATIENT
Start: 2025-01-17

## 2025-01-17 RX ADMIN — MIDAZOLAM 2 MG: 1 INJECTION INTRAMUSCULAR; INTRAVENOUS at 09:48

## 2025-01-17 RX ADMIN — LIDOCAINE HYDROCHLORIDE 15 ML: 20 SOLUTION ORAL at 09:41

## 2025-01-17 RX ADMIN — FENTANYL CITRATE 50 MCG: 50 INJECTION, SOLUTION INTRAMUSCULAR; INTRAVENOUS at 09:48

## 2025-01-17 RX ADMIN — MIDAZOLAM 2 MG: 1 INJECTION INTRAMUSCULAR; INTRAVENOUS at 09:49

## 2025-01-17 NOTE — NURSE NAVIGATOR
Successful  45 day post Watchman TIM this am.  No thrombus or significant periprosthetic device leak noted.  See TIM report for additional information.  Patient meets criteria to stop OAC.  Eliquis discontinued.  ASA 81 mg and plavix 75 mg daily ordered per Dr. Loo/Mahesh protocol.  Plavix stop date will be 5/26/2025.  Patient has Watchman coordinator contact ([phone]) information for questions or concerns.    THE   BARTHEL INDEX   Activity Score  FEEDING  0 = unable  5 = needs help cutting, spreading butter, etc., or requires modified diet  10 = independent   10  BATHING  0 = dependent  5 = independent (or in shower)  5  GROOMING  0 = needs to help with personal care  5 = independent face/hair/teeth/shaving (implements provided)  5  DRESSING  0 = dependent  5 = needs help but can do about half unaided  10 = independent (including buttons, zips, laces, etc.)   10  BOWELS  0 = incontinent (or needs to be given enemas)  5 = occasional accident  10 = continent  10  BLADDER  0 = incontinent, or catheterized and unable to manage alone  5 = occasional accident  10 = continent  10  TOILET USE  0 = dependent  5 = needs some help, but can do something alone  10 = independent (on and off, dressing, wiping)  10  TRANSFERS (BED TO CHAIR AND BACK)  0 = unable, no sitting balance  5 = major help (one or two people, physical), can sit  10 = minor help (verbal or physical)  15 = independent   15  MOBILITY (ON LEVEL SURFACES)  0 = immobile or < 50 yards  5 = wheelchair independent, including corners, > 50 yards  10 = walks with help of one person (verbal or physical) > 50 yards  15 = independent (but may use any aid; for example, stick) > 50 yards   15  STAIRS  0 = unable  5 = needs help (verbal, physical, carrying aid)  10 = independent  10  TOTAL (0-100): 100

## 2025-01-17 NOTE — PROGRESS NOTES
TIM by Dr Loo  II ASA II Mallampati  4mg versed  50mcg fentanyl  Viscous Solution given at 0941  Pt tolerated well.

## 2025-01-17 NOTE — PROGRESS NOTES
Patient received to CPRU room # 14  Ambulatory from [facility]. Patient scheduled for TIM today with Dr Loo. Procedure reviewed & questions answered, voiced good understanding consent obtained & placed on chart. All medications and medical history reviewed. Will prep patient per orders. Patient & family updated on plan of care.      The patient has a fraility score of 3-MANAGING WELL, based on patient A&Ox3, patient able to ambulate to room without difficulty.

## 2025-01-17 NOTE — DISCHARGE INSTRUCTIONS
Post Watchman Plavix stop date is 5/26/2025.  AFTER YOU TRANSESOPHAGEAL ECHOCARDIOGRAM    Be sure someone else drives you home. You may feel drowsy for several hours.    Do not eat or drink for at least two hours after your procedure. Your throat will be numb and there is a risk you might have difficulty swallowing for a while. Be careful when you do eat or drink for the first time especially with hot fluids since you could easily burn your throat.    Call your doctor if:    You are bleeding from your throat or mouth.  You have trouble breathing all of a sudden.  You have chest pain or any pain that spreads to your neck, jaw, or arms.  You have questions or concerns.  You have a fever greater than 101°F.    Doctor: Eze    Special Instructions:    No driving for 24 hours.  DO NOT EAT OR DRINK UNTIL 1145AM TODAY.

## 2025-01-21 ENCOUNTER — APPOINTMENT (OUTPATIENT)
Dept: INFUSION THERAPY | Age: 80
End: 2025-01-21
Payer: MEDICARE

## 2025-01-23 ENCOUNTER — HOSPITAL ENCOUNTER (OUTPATIENT)
Dept: INFUSION THERAPY | Age: 80
Setting detail: INFUSION SERIES
Discharge: HOME | End: 2025-01-23
Payer: MEDICARE

## 2025-01-23 VITALS
HEART RATE: 74 BPM | OXYGEN SATURATION: 99 % | TEMPERATURE: 97.5 F | DIASTOLIC BLOOD PRESSURE: 69 MMHG | RESPIRATION RATE: 18 BRPM | SYSTOLIC BLOOD PRESSURE: 113 MMHG

## 2025-01-23 DIAGNOSIS — D50.9 IRON DEFICIENCY ANEMIA, UNSPECIFIED IRON DEFICIENCY ANEMIA TYPE: Primary | ICD-10-CM

## 2025-01-23 PROCEDURE — 96365 THER/PROPH/DIAG IV INF INIT: CPT

## 2025-01-23 PROCEDURE — 2580000003 HC RX 258: Performed by: INTERNAL MEDICINE

## 2025-01-23 PROCEDURE — 6360000002 HC RX W HCPCS: Performed by: INTERNAL MEDICINE

## 2025-01-23 RX ORDER — DIPHENHYDRAMINE HYDROCHLORIDE 50 MG/ML
50 INJECTION INTRAMUSCULAR; INTRAVENOUS
OUTPATIENT
Start: 2025-01-30

## 2025-01-23 RX ORDER — ONDANSETRON 2 MG/ML
8 INJECTION INTRAMUSCULAR; INTRAVENOUS
OUTPATIENT
Start: 2025-01-30

## 2025-01-23 RX ORDER — SODIUM CHLORIDE 0.9 % (FLUSH) 0.9 %
5-40 SYRINGE (ML) INJECTION PRN
OUTPATIENT
Start: 2025-01-30

## 2025-01-23 RX ORDER — ACETAMINOPHEN 325 MG/1
650 TABLET ORAL
OUTPATIENT
Start: 2025-01-30

## 2025-01-23 RX ORDER — SODIUM CHLORIDE 0.9 % (FLUSH) 0.9 %
5-40 SYRINGE (ML) INJECTION PRN
Status: DISCONTINUED | OUTPATIENT
Start: 2025-01-23 | End: 2025-01-24 | Stop reason: HOSPADM

## 2025-01-23 RX ORDER — DIPHENHYDRAMINE HYDROCHLORIDE 50 MG/ML
50 INJECTION INTRAMUSCULAR; INTRAVENOUS
Status: DISCONTINUED | OUTPATIENT
Start: 2025-01-23 | End: 2025-01-24 | Stop reason: HOSPADM

## 2025-01-23 RX ORDER — SODIUM CHLORIDE 9 MG/ML
5-250 INJECTION, SOLUTION INTRAVENOUS PRN
OUTPATIENT
Start: 2025-01-30

## 2025-01-23 RX ORDER — ONDANSETRON 2 MG/ML
8 INJECTION INTRAMUSCULAR; INTRAVENOUS
Status: DISCONTINUED | OUTPATIENT
Start: 2025-01-23 | End: 2025-01-24 | Stop reason: HOSPADM

## 2025-01-23 RX ORDER — SODIUM CHLORIDE 9 MG/ML
INJECTION, SOLUTION INTRAVENOUS CONTINUOUS
OUTPATIENT
Start: 2025-01-30

## 2025-01-23 RX ORDER — HYDROCORTISONE SODIUM SUCCINATE 100 MG/2ML
100 INJECTION INTRAMUSCULAR; INTRAVENOUS
OUTPATIENT
Start: 2025-01-30

## 2025-01-23 RX ORDER — EPINEPHRINE 1 MG/ML
0.3 INJECTION, SOLUTION, CONCENTRATE INTRAVENOUS PRN
Status: DISCONTINUED | OUTPATIENT
Start: 2025-01-23 | End: 2025-01-24 | Stop reason: HOSPADM

## 2025-01-23 RX ORDER — HEPARIN 100 UNIT/ML
500 SYRINGE INTRAVENOUS PRN
OUTPATIENT
Start: 2025-01-30

## 2025-01-23 RX ORDER — ALBUTEROL SULFATE 90 UG/1
4 INHALANT RESPIRATORY (INHALATION) PRN
Status: ACTIVE | OUTPATIENT
Start: 2025-01-23 | End: 2025-01-23

## 2025-01-23 RX ORDER — ACETAMINOPHEN 325 MG/1
650 TABLET ORAL
Status: DISCONTINUED | OUTPATIENT
Start: 2025-01-23 | End: 2025-01-24 | Stop reason: HOSPADM

## 2025-01-23 RX ORDER — EPINEPHRINE 1 MG/ML
0.3 INJECTION, SOLUTION, CONCENTRATE INTRAVENOUS PRN
OUTPATIENT
Start: 2025-01-30

## 2025-01-23 RX ORDER — HYDROCORTISONE SODIUM SUCCINATE 100 MG/2ML
100 INJECTION INTRAMUSCULAR; INTRAVENOUS
Status: DISCONTINUED | OUTPATIENT
Start: 2025-01-23 | End: 2025-01-24 | Stop reason: HOSPADM

## 2025-01-23 RX ORDER — ALBUTEROL SULFATE 90 UG/1
4 INHALANT RESPIRATORY (INHALATION) PRN
OUTPATIENT
Start: 2025-01-30

## 2025-01-23 RX ADMIN — FERUMOXYTOL 510 MG: 510 INJECTION INTRAVENOUS at 09:37

## 2025-01-23 NOTE — PROGRESS NOTES
Arrived to the Infusion Center.  Feraheme completed. Patient tolerated well.   Any issues or concerns during appointment: none.  Patient aware of next lab and [facility] office visit on 5/6/25 (date) at 2:30 PM (time).  Patient instructed to call provider with temperature of 100.4 or greater or nausea/vomiting/diarrhea or pain not controlled by medications  Discharged ambulatory.

## 2025-01-30 ENCOUNTER — TELEPHONE (OUTPATIENT)
Age: 80
End: 2025-01-30

## 2025-01-30 ASSESSMENT — ENCOUNTER SYMPTOMS: SHORTNESS OF BREATH: 0

## 2025-01-30 NOTE — TELEPHONE ENCOUNTER
Dr Butts from [facility] called informing us pt is there for another GI bleed. They can not find the source. He had a WATCHMAN device in November 2024. He is currently taking ASA 81 and Plavix. Per Dr Lyons, protocol is to stay on both 6 months post the device. However, if they need to take him off the Plavix due to his gi bleeding, they can. I advised that Dr Lyons highly recommended to continue the ASA 81. Dr Butts verbalized understanding.

## 2025-01-31 ENCOUNTER — OFFICE VISIT (OUTPATIENT)
Age: 80
End: 2025-01-31
Payer: MEDICARE

## 2025-01-31 VITALS
HEIGHT: 67 IN | HEART RATE: 62 BPM | WEIGHT: 185.6 LBS | BODY MASS INDEX: 29.13 KG/M2 | SYSTOLIC BLOOD PRESSURE: 112 MMHG | DIASTOLIC BLOOD PRESSURE: 64 MMHG

## 2025-01-31 DIAGNOSIS — Z95.818 PRESENCE OF WATCHMAN LEFT ATRIAL APPENDAGE CLOSURE DEVICE: Primary | ICD-10-CM

## 2025-01-31 DIAGNOSIS — I48.11 LONGSTANDING PERSISTENT ATRIAL FIBRILLATION (HCC): ICD-10-CM

## 2025-01-31 DIAGNOSIS — D50.9 IRON DEFICIENCY ANEMIA, UNSPECIFIED IRON DEFICIENCY ANEMIA TYPE: ICD-10-CM

## 2025-01-31 PROCEDURE — 1036F TOBACCO NON-USER: CPT | Performed by: INTERNAL MEDICINE

## 2025-01-31 PROCEDURE — G8427 DOCREV CUR MEDS BY ELIG CLIN: HCPCS | Performed by: INTERNAL MEDICINE

## 2025-01-31 PROCEDURE — 1159F MED LIST DOCD IN RCRD: CPT | Performed by: INTERNAL MEDICINE

## 2025-01-31 PROCEDURE — G8417 CALC BMI ABV UP PARAM F/U: HCPCS | Performed by: INTERNAL MEDICINE

## 2025-01-31 PROCEDURE — 1126F AMNT PAIN NOTED NONE PRSNT: CPT | Performed by: INTERNAL MEDICINE

## 2025-01-31 PROCEDURE — 3074F SYST BP LT 130 MM HG: CPT | Performed by: INTERNAL MEDICINE

## 2025-01-31 PROCEDURE — 1123F ACP DISCUSS/DSCN MKR DOCD: CPT | Performed by: INTERNAL MEDICINE

## 2025-01-31 PROCEDURE — 3078F DIAST BP <80 MM HG: CPT | Performed by: INTERNAL MEDICINE

## 2025-01-31 PROCEDURE — 99214 OFFICE O/P EST MOD 30 MIN: CPT | Performed by: INTERNAL MEDICINE

## 2025-01-31 RX ORDER — PANTOPRAZOLE SODIUM 40 MG/1
40 TABLET, DELAYED RELEASE ORAL DAILY
COMMUNITY

## 2025-01-31 NOTE — PROGRESS NOTES
[address], [address]  [place] [address]  PHONE: [phone]    Joaquim Ricci  1945      SUBJECTIVE:   Joaquim Ricci is a 79 y.o. male seen for a follow up visit regarding the following:     Chief Complaint   Patient presents with    Follow-Up from [facility]       HPI:    Patient presents for follow-up.  He was originally seen in October with recurrent iron deficiency anemia.  He has known history of atrial fibrillation and his anticoagulation had been discontinued.  He was receiving iron transfusions by hematology.  He was felt appropriate candidate for left atrial appendage occlusion which was performed as outlined below:    Left arial appendage occlusion (11/26/24):  24 mm Watchman FLX Pro device.     He did well following the procedure and was ultimately discharged home the same day.  He underwent repeat transesophageal echocardiogram by Dr. Loo on January 17.  This showed normal LV systolic function with EF 55 to 60%.  His Watchman device was in good position with no peridevice leak or thrombus.    Based on the reassuring results of his transesophageal echocardiogram.  His Eliquis was discontinued and he was placed on aspirin and Plavix.  Anticipated Plavix stop date was May 26, 2025.    Notes that he was hospitalized at [facility] with recurrent GI bleed.  No etiology of bleeding idenitified.  Given 2 units of blood.  Still on ASA and Plavix.     Primary Cardiologist:  Dr. Lyons.        Past Medical History, Past Surgical History, Family history, Social History, and Medications were all reviewed with the patient today and updated as necessary.           Current Outpatient Medications:     pantoprazole (PROTONIX) 40 MG tablet, Take 1 tablet by mouth daily, Disp: , Rfl:     aspirin 81 MG EC tablet, Take 1 tablet by mouth daily, Disp: 90 tablet, Rfl: 1    Handicap Placard MISC, by Does not apply route, Disp: 1 each, Rfl: 0    metoprolol succinate

## 2025-02-20 ENCOUNTER — TELEPHONE (OUTPATIENT)
Age: 80
End: 2025-02-20

## 2025-02-20 NOTE — TELEPHONE ENCOUNTER
Patient left a VM stating he got an echo done at [facility] two weeks ago. Wanting to know if we still need this one done that he has scheduled this Monday? Patient would like a call in regards to this. ~Thank You~

## 2025-02-21 NOTE — TELEPHONE ENCOUNTER
I spoke with the pt and let him know that we will cancel the upcoming echo. Pt voiced understanding.

## 2025-04-30 ENCOUNTER — OFFICE VISIT (OUTPATIENT)
Age: 80
End: 2025-04-30
Payer: MEDICARE

## 2025-04-30 VITALS
DIASTOLIC BLOOD PRESSURE: 62 MMHG | HEART RATE: 70 BPM | BODY MASS INDEX: 28.25 KG/M2 | WEIGHT: 180 LBS | SYSTOLIC BLOOD PRESSURE: 124 MMHG | HEIGHT: 67 IN

## 2025-04-30 DIAGNOSIS — I48.0 PAROXYSMAL ATRIAL FIBRILLATION (HCC): ICD-10-CM

## 2025-04-30 DIAGNOSIS — D50.9 IRON DEFICIENCY ANEMIA, UNSPECIFIED IRON DEFICIENCY ANEMIA TYPE: ICD-10-CM

## 2025-04-30 DIAGNOSIS — I25.10 ASCVD (ARTERIOSCLEROTIC CARDIOVASCULAR DISEASE): ICD-10-CM

## 2025-04-30 DIAGNOSIS — Z95.818 PRESENCE OF WATCHMAN LEFT ATRIAL APPENDAGE CLOSURE DEVICE: Primary | ICD-10-CM

## 2025-04-30 PROCEDURE — 1036F TOBACCO NON-USER: CPT | Performed by: INTERNAL MEDICINE

## 2025-04-30 PROCEDURE — 3078F DIAST BP <80 MM HG: CPT | Performed by: INTERNAL MEDICINE

## 2025-04-30 PROCEDURE — 3074F SYST BP LT 130 MM HG: CPT | Performed by: INTERNAL MEDICINE

## 2025-04-30 PROCEDURE — 99214 OFFICE O/P EST MOD 30 MIN: CPT | Performed by: INTERNAL MEDICINE

## 2025-04-30 PROCEDURE — 1123F ACP DISCUSS/DSCN MKR DOCD: CPT | Performed by: INTERNAL MEDICINE

## 2025-04-30 PROCEDURE — G8417 CALC BMI ABV UP PARAM F/U: HCPCS | Performed by: INTERNAL MEDICINE

## 2025-04-30 PROCEDURE — 1126F AMNT PAIN NOTED NONE PRSNT: CPT | Performed by: INTERNAL MEDICINE

## 2025-04-30 PROCEDURE — G8428 CUR MEDS NOT DOCUMENT: HCPCS | Performed by: INTERNAL MEDICINE

## 2025-04-30 NOTE — PROGRESS NOTES
[address], [address]  [place] [address]  PHONE: [phone]    SUBJECTIVE:   Joaquim Ricci is a 79 y.o. male 1945   seen for a follow up visit regarding the following:     Chief Complaint   Patient presents with    Atrial Fibrillation         History of Present Illness  The patient is an 79-year-old individual with a medical history significant for atrial fibrillation, iron deficiency anemia, atherosclerotic cardiovascular disease (ASCVD), and aortic stenosis.    The patient reports a general sense of well-being but experiences significant distress due to unresolved anemia. Symptoms of asthenia and dyspnea secondary to anemia severely limit physical activity. Hemoglobin level was recorded at 8 g/dL on 04/18/2025, with a current perceived level of 7.5 g/dL. The patient is under the care of hematologist Dr. Allen, with a blood test scheduled for Friday, followed by a consultation the next Friday. A gastroenterology consultation 2-3 weeks ago resulted in the decision to perform an upper balloon endoscopy.     Three Hemoccult tests have been completed, with results pending, and no overt blood has been observed in the stool. A comprehensive gastrointestinal workup has been completed, revealing no obvious sources of bleeding. A camera endoscopy showed no abnormalities. The patient has received iron infusions, the most recent being administered yesterday, with another infusion planned within a week. A balloon endoscopy is scheduled for 05/21/2025. Consideration is being given to performing a bone marrow biopsy.    PAST SURGICAL HISTORY:  Watchman device implantation on 11/26/2024  Upper endoscopy  Colonoscopy in 10/2023  Previous coronary artery bypass graft surgery    SOCIAL HISTORY  -           Cardiac History:  Coronary artery bypass grafting in 1985  PCI 2014 2018 cardioversion  Echocardiogram 5/2020 1/2022 echocardiogram Jane ejection fraction

## 2025-05-19 ENCOUNTER — TELEPHONE (OUTPATIENT)
Age: 80
End: 2025-05-19

## 2025-05-19 NOTE — TELEPHONE ENCOUNTER
Cardiac Clearance        Physician or Practice Requesting:[facility]   : [place]  Contact Phone Number: [phone]   Fax Number: [phone]  Date of Surgery/Procedure: 6/3/25  Type of Surgery or Procedure: Extractions, bone graft, implant placement  Type of Anesthesia: IV conscience sedation  Type of Clearance Requested: Cardiac Clearance and Medication Hold  Medication to Hold:If pt is on any blood thinners  Days to Hold: Cardiologist preference

## 2025-05-21 NOTE — TELEPHONE ENCOUNTER
Pre-Operative Risk Assessment Using 2014 ACC/AHA Guidelines     Emergent procedure: No  Active Cardiac Condition including decompensated HF, Arrhythmia, MI <3 weeks, severe valve disease: No  Risk Level of Procedure: low

## 2025-07-07 ENCOUNTER — HOSPITAL ENCOUNTER (OUTPATIENT)
Dept: PHYSICAL THERAPY | Age: 80
Setting detail: RECURRING SERIES
Discharge: HOME | End: 2025-07-10
Payer: MEDICARE

## 2025-07-07 DIAGNOSIS — M25.551 PAIN IN RIGHT HIP: ICD-10-CM

## 2025-07-07 DIAGNOSIS — M54.59 OTHER LOW BACK PAIN: Primary | ICD-10-CM

## 2025-07-07 DIAGNOSIS — M25.60 JOINT STIFFNESS OF SPINE: ICD-10-CM

## 2025-07-07 DIAGNOSIS — M25.552 PAIN IN LEFT HIP: ICD-10-CM

## 2025-07-07 PROCEDURE — 97162 PT EVAL MOD COMPLEX 30 MIN: CPT

## 2025-07-07 PROCEDURE — 97110 THERAPEUTIC EXERCISES: CPT

## 2025-07-07 ASSESSMENT — PAIN SCALES - GENERAL: PAINLEVEL_OUTOF10: 4

## 2025-07-07 NOTE — PROGRESS NOTES
Joaquim Bran Radhames  : 1945  Primary: Medicare Part A And B (Medicare)  Secondary: [employer] [place] MEDICARE SUPP [facility] [place] [address] [address] [place] [address] [address]  Phone: [phone]  Fax: [phone] Plan Frequency: 1-2 sessions per week    Plan of Care/Certification Expiration Date: 10/05/25        Plan of Care/Certification Expiration Date:  Plan of Care/Certification Expiration Date: 10/05/25    Frequency/Duration: Plan Frequency: 1-2 sessions per week      Time In/Out:   Time In: 0800  Time Out: 0845      PT Visit Info:         Visit Count:  1    OUTPATIENT PHYSICAL THERAPY:   Treatment Note 2025       Episode  (hip pain rehabilitation)               Treatment Diagnosis:    Other low back pain  Pain in right hip  Pain in left hip  Joint stiffness of spine  Medical/Referring Diagnosis:    Bilateral hip pain    Referring Physician:  Sharan Aguilar DO MD Orders:  PT Eval and Treat   Return MD Appt:     Date of Onset:    Allergies:   Lisinopril, Lorazepam, Sulfur, and Sulfa antibiotics  Restrictions/Precautions:   None      Interventions Planned (Treatment may consist of any combination of the following):     See Assessment Note    Subjective Comments:   See eval  Initial Pain Level:     10  Post Session Pain Level:      10  Medications Last Reviewed: 2025  Updated Objective Findings:  See Evaluation Note from today  Treatment   TREATMENT:   THERAPEUTIC ACTIVITY: ( see below for minutes): Therapeutic activities per grid below to improve mobility, strength, balance and coordination. Required minimal visual, verbal, manual and tactile cues to improve independence and safety with daily activities .  THERAPEUTIC EXERCISE: (see below for minutes): Exercises per grid below to improve mobility, strength, balance and coordination. Required minimal verbal and manual cues to promote proper body alignment, promote proper body posture and promote proper body mechanics.

## 2025-07-07 NOTE — THERAPY EVALUATION
(Goals have been discussed and agreed upon with patient.)  Short-Term Functional Goals: Time Frame: 6 weeks  Patient will achieve 30 deg of lumbar extension.  Patient will achieve 90 deg of lumbar flexion.  Patient will achieve 20 deg of lumbar SB bilaterally.  Patient will ambulate greater than 10 minutes with less than 4/10 pain.  Patient will ambulate 20 minutes with less than 4/10 pain.   Discharge Goals: Time Frame: 12 weeks  Patient will ambulate for >20 minutes with 0/10 pain.  Patient will demonstrate HEP with proper technique.  Patient will ambulate up hills with less than 2/10 pain.          Medical Necessity:   > Patient is expected to demonstrate progress in strength, range of motion, balance, coordination, and functional technique to increase independence with ambulating for greater than 10-15 minutes.  Reason For Services/Other Comments:  Patient will benefit from skilled therapy to improve pain, mobility, stability, strength, motor control and functional capacity in order for the patient to return to ambulating longer than 10 minutes without difficulty.      Regarding Joaquim Ricci's therapy, I certify that the treatment plan above will be carried out by a therapist or under their direction.  Thank you for this referral,  BANG ARTHUR PT     Referring Physician Signature: Sharan Aguilar,  _______________________________ Date _____________        Charge Capture  Events  Appt Desk  Attendance Report

## 2025-07-09 ENCOUNTER — HOSPITAL ENCOUNTER (OUTPATIENT)
Dept: PHYSICAL THERAPY | Age: 80
Setting detail: RECURRING SERIES
Discharge: HOME | End: 2025-07-12
Payer: MEDICARE

## 2025-07-09 PROCEDURE — 97110 THERAPEUTIC EXERCISES: CPT

## 2025-07-09 NOTE — PROGRESS NOTES
Joaquim Howardnettie Ricci  : 1945  Primary: Medicare Part A And B (Medicare)  Secondary: M[employer] [place] MEDICARE SUPP [facility] [place] [address] [facility] [address]  Phone: [phone]  Fax: [phone] Plan Frequency: 1-2 sessions per week    Plan of Care/Certification Expiration Date: 10/05/25        Plan of Care/Certification Expiration Date:  Plan of Care/Certification Expiration Date: 10/05/25    Frequency/Duration: Plan Frequency: 1-2 sessions per week      Time In/Out:   Time In: 1015  Time Out: 1055      PT Visit Info:         Visit Count:  2    OUTPATIENT PHYSICAL THERAPY:   Treatment Note 2025       Episode  (hip pain rehabilitation)               Treatment Diagnosis:    Other low back pain  Pain in right hip  Pain in left hip  Joint stiffness of spine  Medical/Referring Diagnosis:    Bilateral hip pain    Referring Physician:  Sharan Aguilar DO MD Orders:  PT Eval and Treat   Return MD Appt:     Date of Onset:    Allergies:   Lisinopril, Lorazepam, Sulfur, and Sulfa antibiotics  Restrictions/Precautions:   None      Interventions Planned (Treatment may consist of any combination of the following):     See Assessment Note    Subjective Comments:   Played golf yesterday but had some soreness that lingering today. Left hip and leg are hurting worse yesterday and today.         Initial Pain Level:     10  Post Session Pain Level:       /10  Medications Last Reviewed: 2025  Updated Objective Findings:      Lumbar flexion: 74 deg tight in low back and posterior bilateral hips at end range  Lumbar extension: 10 deg, left anterior hip tension noted, pain in the central lumbar spine  Lumbar right SB: 14 deg pain low back, stretch in left lumbar  Lumbar left SB: 6 deg pain on left lumbar spine and some pull in the lumbar spine      25: following bike x 5 min  Lumbar flexion: 80 deg  Lumbar ext: 20 deg  Lumbar SB left:  Lumbar SB right:       Treatment   TREATMENT:

## 2025-07-14 ENCOUNTER — HOSPITAL ENCOUNTER (OUTPATIENT)
Dept: PHYSICAL THERAPY | Age: 80
Setting detail: RECURRING SERIES
Discharge: HOME | End: 2025-07-17
Payer: MEDICARE

## 2025-07-14 PROCEDURE — 97110 THERAPEUTIC EXERCISES: CPT

## 2025-07-14 PROCEDURE — 97140 MANUAL THERAPY 1/> REGIONS: CPT

## 2025-07-14 NOTE — PROGRESS NOTES
Joaquim Howardnettie Ricci  : 1945  Primary: Medicare Part A [place] B (Medicare)  Secondary: M[employer]UAL [place] MEDICARE SUPP [facility] @ [place] [address] [address] [facility] [facility] [address]  Phone: [phone]  Fax: [phone] Plan Frequency: 1-2 sessions per week    Plan of Care/Certification Expiration Date: 10/05/25        Plan of Care/Certification Expiration Date:  Plan of Care/Certification Expiration Date: 10/05/25    Frequency/Duration: Plan Frequency: 1-2 sessions per week      Time In/Out:   Time In: 1145  Time Out: 1230      PT Visit Info:         Visit Count:  3    OUTPATIENT PHYSICAL THERAPY:   Treatment Note 2025       Episode  (hip pain rehabilitation)               Treatment Diagnosis:    Other low back pain  Pain in right hip  Pain in left hip  Joint stiffness of spine  Medical/Referring Diagnosis:    Bilateral hip pain    Referring Physician:  Sharan Aguilar DO MD Orders:  PT Eval and Treat   Return MD Appt:     Date of Onset:    Allergies:   Lisinopril, Lorazepam, Sulfur, and Sulfa antibiotics  Restrictions/Precautions:   None      Interventions Planned (Treatment may consist of any combination of the following):     See Assessment Note    Subjective Comments:   Patient notes he was able to walk 30 min and stopped 4-5 times for a few seconds.         Initial Pain Level:     1 /10  Post Session Pain Level:       /10  Medications Last Reviewed: 2025  Updated Objective Findings:      Lumbar flexion: 74 deg tight in low back and posterior bilateral hips at end range  Lumbar extension: 10 deg, left anterior hip tension noted, pain in the central lumbar spine  Lumbar right SB: 14 deg pain low back, stretch in left lumbar  Lumbar left SB: 6 deg pain on left lumbar spine and some pull in the lumbar spine      25: following bike x 5 min  Lumbar flexion: 80 deg  Lumbar ext: 20 deg  Lumbar SB left:  Lumbar SB right:    25:  Lumbar flexion: 90 deg  Lumbar extension: 30 deg

## 2025-07-15 ENCOUNTER — APPOINTMENT (OUTPATIENT)
Dept: PHYSICAL THERAPY | Age: 80
End: 2025-07-15
Payer: MEDICARE

## 2025-07-16 ENCOUNTER — APPOINTMENT (OUTPATIENT)
Dept: PHYSICAL THERAPY | Age: 80
End: 2025-07-16
Payer: MEDICARE

## 2025-07-17 ENCOUNTER — APPOINTMENT (OUTPATIENT)
Dept: PHYSICAL THERAPY | Age: 80
End: 2025-07-17
Payer: MEDICARE

## 2025-07-18 ENCOUNTER — HOSPITAL ENCOUNTER (OUTPATIENT)
Dept: PHYSICAL THERAPY | Age: 80
Setting detail: RECURRING SERIES
Discharge: HOME | End: 2025-07-21
Payer: MEDICARE

## 2025-07-18 PROCEDURE — 97110 THERAPEUTIC EXERCISES: CPT

## 2025-07-18 PROCEDURE — 97140 MANUAL THERAPY 1/> REGIONS: CPT

## 2025-07-18 NOTE — PROGRESS NOTES
Joaquim Shant Ricci  : 1945  Primary: Medicare Part A [place] B (Medicare)  Secondary: MUTUAL [place] MEDICARE SUPP [facility] @ [place] [address] [facility] [address]  Phone: [phone]  Fax: [phone] Plan Frequency: 1-2 sessions per week    Plan of Care/Certification Expiration Date: 10/05/25        Plan of Care/Certification Expiration Date:  Plan of Care/Certification Expiration Date: 10/05/25    Frequency/Duration: Plan Frequency: 1-2 sessions per week      Time In/Out:   Time In: 0850  Time Out: 0930      PT Visit Info:         Visit Count:  4    OUTPATIENT PHYSICAL THERAPY:   Treatment Note 2025       Episode  (hip pain rehabilitation)               Treatment Diagnosis:    Other low back pain  Pain in right hip  Pain in left hip  Joint stiffness of spine  Medical/Referring Diagnosis:    Bilateral hip pain    Referring Physician:  Sharan Aguilar DO MD Orders:  PT Eval and Treat   Return MD Appt:     Date of Onset:    Allergies:   Lisinopril, Lorazepam, Sulfur, and Sulfa antibiotics  Restrictions/Precautions:   None      Interventions Planned (Treatment may consist of any combination of the following):     See Assessment Note    Subjective Comments:   Notes improved pain following last session.         Initial Pain Level:     1 /10  Post Session Pain Level:       10  Medications Last Reviewed: 2025  Updated Objective Findings:      Lumbar flexion: 74 deg tight in low back and posterior bilateral hips at end range  Lumbar extension: 10 deg, left anterior hip tension noted, pain in the central lumbar spine  Lumbar right SB: 14 deg pain low back, stretch in left lumbar  Lumbar left SB: 6 deg pain on left lumbar spine and some pull in the lumbar spine      25: following bike x 5 min  Lumbar flexion: 80 deg  Lumbar ext: 20 deg  Lumbar SB left:  Lumbar SB right:    25:  Lumbar flexion: 90 deg  Lumbar extension: 30 deg following manual          Treatment

## 2025-07-22 ENCOUNTER — APPOINTMENT (OUTPATIENT)
Dept: PHYSICAL THERAPY | Age: 80
End: 2025-07-22
Payer: MEDICARE

## 2025-07-23 ENCOUNTER — HOSPITAL ENCOUNTER (OUTPATIENT)
Dept: PHYSICAL THERAPY | Age: 80
Setting detail: RECURRING SERIES
Discharge: HOME | End: 2025-07-26
Payer: MEDICARE

## 2025-07-23 PROCEDURE — 97140 MANUAL THERAPY 1/> REGIONS: CPT

## 2025-07-23 PROCEDURE — 97110 THERAPEUTIC EXERCISES: CPT

## 2025-07-23 NOTE — PROGRESS NOTES
Joaquim Howardnettie Ricci  : 1945  Primary: Medicare Part A And B (Medicare)  Secondary: [employer] [place] MEDICARE SUPP [facility] [place] [address] [facility] [address]  Phone: [phone]  Fax: [phone] Plan Frequency: 1-2 sessions per week    Plan of Care/Certification Expiration Date: 10/05/25        Plan of Care/Certification Expiration Date:  Plan of Care/Certification Expiration Date: 10/05/25    Frequency/Duration: Plan Frequency: 1-2 sessions per week      Time In/Out:   Time In: 1100  Time Out: 1145      PT Visit Info:         Visit Count:  5    OUTPATIENT PHYSICAL THERAPY:   Treatment Note 2025       Episode  (hip pain rehabilitation)               Treatment Diagnosis:    Other low back pain  Pain in right hip  Pain in left hip  Joint stiffness of spine  Medical/Referring Diagnosis:    Bilateral hip pain    Referring Physician:  Sharan Aguilar DO MD Orders:  PT Eval and Treat   Return MD Appt:     Date of Onset:    Allergies:   Lisinopril, Lorazepam, Sulfur, and Sulfa antibiotics  Restrictions/Precautions:   None      Interventions Planned (Treatment may consist of any combination of the following):     See Assessment Note    Subjective Comments:   Patient notes that a couple days after the last session he noted some soreness in the left hip. Notes burning in the medial left groin and some soreness in the posterior left hip.         Initial Pain Level:     1 /10, 5-6/10 with ambulation and giving way  Post Session Pain Level:      1 /10  Medications Last Reviewed: 2025  Updated Objective Findings:      Lumbar flexion: 74 deg tight in low back and posterior bilateral hips at end range  Lumbar extension: 10 deg, left anterior hip tension noted, pain in the central lumbar spine  Lumbar right SB: 14 deg pain low back, stretch in left lumbar  Lumbar left SB: 6 deg pain on left lumbar spine and some pull in the lumbar spine      25: following bike x 5 min  Lumbar

## 2025-07-25 ENCOUNTER — HOSPITAL ENCOUNTER (OUTPATIENT)
Dept: PHYSICAL THERAPY | Age: 80
Setting detail: RECURRING SERIES
Discharge: HOME | End: 2025-07-28
Payer: MEDICARE

## 2025-07-25 PROCEDURE — 97110 THERAPEUTIC EXERCISES: CPT

## 2025-07-25 PROCEDURE — 97140 MANUAL THERAPY 1/> REGIONS: CPT

## 2025-07-25 NOTE — PROGRESS NOTES
Summary:    Treatment Assessment:   Improved transfers and ambulation following manual therapy and exercise. Added TrA for hep. Taping assisted with SL WB tolerance. Edu in stair negotiation.     Communication/Consultation:  Spoke with patient in regards to PT POC.  Equipment provided today:  HEP  Recommendations/Intent for next treatment session: Next visit will focus on lumbar and hip mobility, and strength.    >Total Treatment Billable Duration:  45 minutes   Time In: 0845  Time Out: 0930     GABRIEL MARQUEZ PT         Charge Capture  Events  [facility] Portal  Appt Desk  Attendance Report     Future Appointments   Date Time Provider Department Center   7/28/2025  2:00 PM Gabriel Marquez, PT [facility]   8/1/2025 10:15 AM Gabriel Marquez, PT [facility]   8/4/2025  9:30 AM Gabriel Marquez, PT [facility]   8/13/2025  9:30 AM Gabriel Marquez, PT [facility]   8/15/2025  9:30 AM Gabriel Marquez, PT [facility]   8/18/2025  9:30 AM Gabriel Marquez, PT [facility]   8/20/2025  9:30 AM Gabriel Marquez, PT [facility]   8/25/2025  9:30 AM Gabriel Marquez, PT [facility]   8/27/2025  9:30 AM Gabriel Marquez, PT [facility]   10/31/2025 10:00 AM Armaan Lyons MD [facility]

## 2025-07-27 ENCOUNTER — HOSPITAL ENCOUNTER (OUTPATIENT)
Age: 80
Setting detail: OBSERVATION
Discharge: HOME | End: 2025-07-29
Attending: EMERGENCY MEDICINE | Admitting: INTERNAL MEDICINE
Payer: MEDICARE

## 2025-07-27 DIAGNOSIS — I24.9 ACUTE CORONARY SYNDROME (HCC): Primary | ICD-10-CM

## 2025-07-27 DIAGNOSIS — D64.9 NORMOCHROMIC ANEMIA: ICD-10-CM

## 2025-07-27 DIAGNOSIS — R07.9 CHEST PAIN, UNSPECIFIED TYPE: ICD-10-CM

## 2025-07-27 DIAGNOSIS — I21.4 NSTEMI (NON-ST ELEVATED MYOCARDIAL INFARCTION) (HCC): ICD-10-CM

## 2025-07-27 DIAGNOSIS — R07.9 CHEST PAIN: ICD-10-CM

## 2025-07-27 PROCEDURE — 99285 EMERGENCY DEPT VISIT HI MDM: CPT

## 2025-07-27 PROCEDURE — 93005 ELECTROCARDIOGRAM TRACING: CPT | Performed by: EMERGENCY MEDICINE

## 2025-07-27 ASSESSMENT — PAIN SCALES - GENERAL: PAINLEVEL_OUTOF10: 6

## 2025-07-27 ASSESSMENT — LIFESTYLE VARIABLES
HOW MANY STANDARD DRINKS CONTAINING ALCOHOL DO YOU HAVE ON A TYPICAL DAY: 1 OR 2
HOW OFTEN DO YOU HAVE A DRINK CONTAINING ALCOHOL: 2-4 TIMES A MONTH

## 2025-07-27 ASSESSMENT — PAIN - FUNCTIONAL ASSESSMENT: PAIN_FUNCTIONAL_ASSESSMENT: 0-10

## 2025-07-27 ASSESSMENT — PAIN DESCRIPTION - LOCATION: LOCATION: ARM

## 2025-07-27 ASSESSMENT — PAIN DESCRIPTION - ORIENTATION: ORIENTATION: RIGHT;LEFT;UPPER

## 2025-07-28 ENCOUNTER — APPOINTMENT (OUTPATIENT)
Dept: GENERAL RADIOLOGY | Age: 80
End: 2025-07-28
Payer: MEDICARE

## 2025-07-28 ENCOUNTER — APPOINTMENT (OUTPATIENT)
Dept: NON INVASIVE DIAGNOSTICS | Age: 80
End: 2025-07-28
Payer: MEDICARE

## 2025-07-28 ENCOUNTER — HOSPITAL ENCOUNTER (OUTPATIENT)
Dept: PHYSICAL THERAPY | Age: 80
Setting detail: RECURRING SERIES
End: 2025-07-28
Payer: MEDICARE

## 2025-07-28 PROBLEM — K92.2 GIB (GASTROINTESTINAL BLEEDING): Status: ACTIVE | Noted: 2025-07-28

## 2025-07-28 PROBLEM — R07.9 CHEST PAIN: Status: ACTIVE | Noted: 2025-07-28

## 2025-07-28 PROBLEM — I21.4 NSTEMI (NON-ST ELEVATED MYOCARDIAL INFARCTION) (HCC): Status: ACTIVE | Noted: 2025-07-28

## 2025-07-28 LAB
ALBUMIN SERPL-MCNC: 3.5 G/DL (ref 3.2–4.6)
ALBUMIN/GLOB SERPL: 1.1 (ref 1–1.9)
ALP SERPL-CCNC: 148 U/L (ref 40–129)
ALT SERPL-CCNC: 43 U/L (ref 8–55)
ANION GAP SERPL CALC-SCNC: 12 MMOL/L (ref 7–16)
ANION GAP SERPL CALC-SCNC: 14 MMOL/L (ref 7–16)
APTT PPP: 28.4 SEC (ref 23.3–37.4)
AST SERPL-CCNC: 38 U/L (ref 15–37)
BASOPHILS # BLD: 0.03 K/UL (ref 0–0.2)
BASOPHILS NFR BLD: 0.6 % (ref 0–2)
BILIRUB SERPL-MCNC: 0.3 MG/DL (ref 0–1.2)
BUN SERPL-MCNC: 39 MG/DL (ref 8–23)
BUN SERPL-MCNC: 44 MG/DL (ref 8–23)
CALCIUM SERPL-MCNC: 8.8 MG/DL (ref 8.8–10.2)
CALCIUM SERPL-MCNC: 8.9 MG/DL (ref 8.8–10.2)
CHLORIDE SERPL-SCNC: 103 MMOL/L (ref 98–107)
CHLORIDE SERPL-SCNC: 105 MMOL/L (ref 98–107)
CO2 SERPL-SCNC: 20 MMOL/L (ref 20–29)
CO2 SERPL-SCNC: 23 MMOL/L (ref 20–29)
CREAT SERPL-MCNC: 1.51 MG/DL (ref 0.8–1.3)
CREAT SERPL-MCNC: 1.58 MG/DL (ref 0.8–1.3)
DIFFERENTIAL METHOD BLD: ABNORMAL
ECHO AO ASC DIAM: 3.1 CM
ECHO AO ASCENDING AORTA INDEX: 1.64 CM/M2
ECHO AO ROOT DIAM: 3.1 CM
ECHO AO ROOT INDEX: 1.64 CM/M2
ECHO AV AREA PEAK VELOCITY: 1.1 CM2
ECHO AV AREA VTI: 1 CM2
ECHO AV AREA/BSA PEAK VELOCITY: 0.6 CM2/M2
ECHO AV AREA/BSA VTI: 0.5 CM2/M2
ECHO AV MEAN GRADIENT: 10 MMHG
ECHO AV MEAN VELOCITY: 1.5 M/S
ECHO AV PEAK GRADIENT: 19 MMHG
ECHO AV PEAK VELOCITY: 2.2 M/S
ECHO AV VELOCITY RATIO: 0.27
ECHO AV VTI: 47.7 CM
ECHO BSA: 1.91 M2
ECHO BSA: 1.91 M2
ECHO EST RA PRESSURE: 3 MMHG
ECHO IVC PROX: 2.8 CM
ECHO LA AREA 2C: 29.9 CM2
ECHO LA AREA 4C: 26.3 CM2
ECHO LA DIAMETER INDEX: 2.86 CM/M2
ECHO LA DIAMETER: 5.4 CM
ECHO LA MAJOR AXIS: 7.3 CM
ECHO LA MINOR AXIS: 6.9 CM
ECHO LA TO AORTIC ROOT RATIO: 1.74
ECHO LA VOL BP: 92 ML (ref 18–58)
ECHO LA VOL MOD A2C: 106 ML (ref 18–58)
ECHO LA VOL MOD A4C: 76 ML (ref 18–58)
ECHO LA VOL/BSA BIPLANE: 49 ML/M2 (ref 16–34)
ECHO LA VOLUME INDEX MOD A2C: 56 ML/M2 (ref 16–34)
ECHO LA VOLUME INDEX MOD A4C: 40 ML/M2 (ref 16–34)
ECHO LV E' LATERAL VELOCITY: 7.83 CM/S
ECHO LV E' SEPTAL VELOCITY: 7.64 CM/S
ECHO LV EDV A2C: 77 ML
ECHO LV EDV A4C: 107 ML
ECHO LV EDV INDEX A4C: 57 ML/M2
ECHO LV EDV NDEX A2C: 41 ML/M2
ECHO LV EJECTION FRACTION 3D: 52 %
ECHO LV EJECTION FRACTION A2C: 54 %
ECHO LV EJECTION FRACTION A4C: 58 %
ECHO LV EJECTION FRACTION BIPLANE: 52 % (ref 55–100)
ECHO LV ESV A2C: 36 ML
ECHO LV ESV A4C: 45 ML
ECHO LV ESV INDEX A2C: 19 ML/M2
ECHO LV ESV INDEX A4C: 24 ML/M2
ECHO LV FRACTIONAL SHORTENING: 29 % (ref 28–44)
ECHO LV INTERNAL DIMENSION DIASTOLE INDEX: 2.54 CM/M2
ECHO LV INTERNAL DIMENSION DIASTOLIC: 4.8 CM (ref 4.2–5.9)
ECHO LV INTERNAL DIMENSION SYSTOLIC INDEX: 1.8 CM/M2
ECHO LV INTERNAL DIMENSION SYSTOLIC: 3.4 CM
ECHO LV IVSD: 1.2 CM (ref 0.6–1)
ECHO LV MASS 2D: 206.4 G (ref 88–224)
ECHO LV MASS INDEX 2D: 109.2 G/M2 (ref 49–115)
ECHO LV POSTERIOR WALL DIASTOLIC: 1.1 CM (ref 0.6–1)
ECHO LV RELATIVE WALL THICKNESS RATIO: 0.46
ECHO LVOT AREA: 3.8 CM2
ECHO LVOT AV VTI INDEX: 0.27
ECHO LVOT DIAM: 2.2 CM
ECHO LVOT MEAN GRADIENT: 1 MMHG
ECHO LVOT PEAK GRADIENT: 1 MMHG
ECHO LVOT PEAK VELOCITY: 0.6 M/S
ECHO LVOT STROKE VOLUME INDEX: 26.3 ML/M2
ECHO LVOT SV: 49.8 ML
ECHO LVOT VTI: 13.1 CM
ECHO MV AREA VTI: 1.4 CM2
ECHO MV E DECELERATION TIME (DT): 115 MS
ECHO MV E VELOCITY: 1.8 M/S
ECHO MV E/E' LATERAL: 22.99
ECHO MV E/E' RATIO (AVERAGED): 23.27
ECHO MV E/E' SEPTAL: 23.56
ECHO MV LVOT VTI INDEX: 2.65
ECHO MV MAX VELOCITY: 1.9 M/S
ECHO MV MEAN GRADIENT: 6 MMHG
ECHO MV MEAN VELOCITY: 1 M/S
ECHO MV PEAK GRADIENT: 14 MMHG
ECHO MV REGURGITANT PEAK GRADIENT: 104 MMHG
ECHO MV REGURGITANT PEAK VELOCITY: 5.1 M/S
ECHO MV REGURGITANT VTIA: 184 CM
ECHO MV VTI: 34.7 CM
ECHO PV ACCELERATION TIME (AT): 82 MS
ECHO RIGHT VENTRICULAR SYSTOLIC PRESSURE (RVSP): 32 MMHG
ECHO RV BASAL DIMENSION: 3.5 CM
ECHO RV FREE WALL PEAK S': 6.9 CM/S
ECHO TV REGURGITANT MAX VELOCITY: 2.71 M/S
ECHO TV REGURGITANT PEAK GRADIENT: 29 MMHG
EKG ATRIAL RATE: 0 BPM
EKG ATRIAL RATE: 124 BPM
EKG DIAGNOSIS: NORMAL
EKG DIAGNOSIS: NORMAL
EKG Q-T INTERVAL: 377 MS
EKG Q-T INTERVAL: 417 MS
EKG QRS DURATION: 98 MS
EKG QRS DURATION: 98 MS
EKG QTC CALCULATION (BAZETT): 475 MS
EKG QTC CALCULATION (BAZETT): 496 MS
EKG R AXIS: 58 DEGREES
EKG R AXIS: 67 DEGREES
EKG T AXIS: 48 DEGREES
EKG T AXIS: 57 DEGREES
EKG VENTRICULAR RATE: 104 BPM
EKG VENTRICULAR RATE: 78 BPM
EOSINOPHIL # BLD: 0.16 K/UL (ref 0–0.8)
EOSINOPHIL NFR BLD: 3.3 % (ref 0.5–7.8)
ERYTHROCYTE [DISTWIDTH] IN BLOOD BY AUTOMATED COUNT: 15.7 % (ref 11.9–14.6)
FLUAV RNA SPEC QL NAA+PROBE: NOT DETECTED
FLUBV RNA SPEC QL NAA+PROBE: NOT DETECTED
GLOBULIN SER CALC-MCNC: 3.3 G/DL (ref 2.3–3.5)
GLUCOSE SERPL-MCNC: 180 MG/DL (ref 70–99)
GLUCOSE SERPL-MCNC: 199 MG/DL (ref 70–99)
HCT VFR BLD AUTO: 33.3 % (ref 41.1–50.3)
HGB BLD-MCNC: 10.7 G/DL (ref 13.6–17.2)
IMM GRANULOCYTES # BLD AUTO: 0.02 K/UL (ref 0–0.5)
IMM GRANULOCYTES NFR BLD AUTO: 0.4 % (ref 0–5)
INR PPP: 1.1
LYMPHOCYTES # BLD: 1.24 K/UL (ref 0.5–4.6)
LYMPHOCYTES NFR BLD: 25.9 % (ref 13–44)
MAGNESIUM SERPL-MCNC: 2.2 MG/DL (ref 1.8–2.4)
MAGNESIUM SERPL-MCNC: 2.3 MG/DL (ref 1.8–2.4)
MCH RBC QN AUTO: 30.1 PG (ref 26.1–32.9)
MCHC RBC AUTO-ENTMCNC: 32.1 G/DL (ref 31.4–35)
MCV RBC AUTO: 93.8 FL (ref 82–102)
MONOCYTES # BLD: 0.58 K/UL (ref 0.1–1.3)
MONOCYTES NFR BLD: 12.1 % (ref 4–12)
NEUTS SEG # BLD: 2.76 K/UL (ref 1.7–8.2)
NEUTS SEG NFR BLD: 57.7 % (ref 43–78)
NRBC # BLD: 0 K/UL (ref 0–0.2)
PLATELET # BLD AUTO: 141 K/UL (ref 150–450)
PMV BLD AUTO: 10.5 FL (ref 9.4–12.3)
POTASSIUM SERPL-SCNC: 3.7 MMOL/L (ref 3.5–5.1)
POTASSIUM SERPL-SCNC: 4.4 MMOL/L (ref 3.5–5.1)
PROT SERPL-MCNC: 6.8 G/DL (ref 6.3–8.2)
PROTHROMBIN TIME: 13.7 SEC (ref 11.3–14.9)
RBC # BLD AUTO: 3.55 M/UL (ref 4.23–5.6)
SARS-COV-2 RDRP RESP QL NAA+PROBE: NOT DETECTED
SODIUM SERPL-SCNC: 137 MMOL/L (ref 136–145)
SODIUM SERPL-SCNC: 139 MMOL/L (ref 136–145)
SOURCE: NORMAL
TROPONIN T SERPL HS-MCNC: 142 NG/L (ref 0–22)
TROPONIN T SERPL HS-MCNC: 27.5 NG/L (ref 0–22)
TROPONIN T SERPL HS-MCNC: 445 NG/L (ref 0–22)
TROPONIN T SERPL HS-MCNC: 646 NG/L (ref 0–22)
UFH PPP CHRO-ACNC: 0.38 IU/ML (ref 0.3–0.7)
UFH PPP CHRO-ACNC: <0.1 IU/ML (ref 0.3–0.7)
WBC # BLD AUTO: 4.8 K/UL (ref 4.3–11.1)

## 2025-07-28 PROCEDURE — 83735 ASSAY OF MAGNESIUM: CPT

## 2025-07-28 PROCEDURE — 93010 ELECTROCARDIOGRAM REPORT: CPT | Performed by: INTERNAL MEDICINE

## 2025-07-28 PROCEDURE — 2500000003 HC RX 250 WO HCPCS: Performed by: CASE MANAGER/CARE COORDINATOR

## 2025-07-28 PROCEDURE — 6370000000 HC RX 637 (ALT 250 FOR IP): Performed by: EMERGENCY MEDICINE

## 2025-07-28 PROCEDURE — 96366 THER/PROPH/DIAG IV INF ADDON: CPT

## 2025-07-28 PROCEDURE — 6370000000 HC RX 637 (ALT 250 FOR IP): Performed by: INTERNAL MEDICINE

## 2025-07-28 PROCEDURE — 93306 TTE W/DOPPLER COMPLETE: CPT | Performed by: INTERNAL MEDICINE

## 2025-07-28 PROCEDURE — 85025 COMPLETE CBC W/AUTO DIFF WBC: CPT

## 2025-07-28 PROCEDURE — 99223 1ST HOSP IP/OBS HIGH 75: CPT | Performed by: INTERNAL MEDICINE

## 2025-07-28 PROCEDURE — C1769 GUIDE WIRE: HCPCS | Performed by: INTERNAL MEDICINE

## 2025-07-28 PROCEDURE — 6370000000 HC RX 637 (ALT 250 FOR IP): Performed by: CASE MANAGER/CARE COORDINATOR

## 2025-07-28 PROCEDURE — 99152 MOD SED SAME PHYS/QHP 5/>YRS: CPT | Performed by: INTERNAL MEDICINE

## 2025-07-28 PROCEDURE — 85610 PROTHROMBIN TIME: CPT

## 2025-07-28 PROCEDURE — 6360000002 HC RX W HCPCS: Performed by: EMERGENCY MEDICINE

## 2025-07-28 PROCEDURE — 2709999900 HC NON-CHARGEABLE SUPPLY: Performed by: INTERNAL MEDICINE

## 2025-07-28 PROCEDURE — 80053 COMPREHEN METABOLIC PANEL: CPT

## 2025-07-28 PROCEDURE — C9600 PERC DRUG-EL COR STENT SING: HCPCS | Performed by: INTERNAL MEDICINE

## 2025-07-28 PROCEDURE — 93306 TTE W/DOPPLER COMPLETE: CPT

## 2025-07-28 PROCEDURE — 87636 SARSCOV2 & INF A&B AMP PRB: CPT

## 2025-07-28 PROCEDURE — 85520 HEPARIN ASSAY: CPT

## 2025-07-28 PROCEDURE — C1874 STENT, COATED/COV W/DEL SYS: HCPCS | Performed by: INTERNAL MEDICINE

## 2025-07-28 PROCEDURE — 84484 ASSAY OF TROPONIN QUANT: CPT

## 2025-07-28 PROCEDURE — 92928 PRQ TCAT PLMT NTRAC ST 1 LES: CPT | Performed by: INTERNAL MEDICINE

## 2025-07-28 PROCEDURE — 96365 THER/PROPH/DIAG IV INF INIT: CPT

## 2025-07-28 PROCEDURE — 71046 X-RAY EXAM CHEST 2 VIEWS: CPT

## 2025-07-28 PROCEDURE — 36415 COLL VENOUS BLD VENIPUNCTURE: CPT

## 2025-07-28 PROCEDURE — 93005 ELECTROCARDIOGRAM TRACING: CPT | Performed by: EMERGENCY MEDICINE

## 2025-07-28 PROCEDURE — G0378 HOSPITAL OBSERVATION PER HR: HCPCS

## 2025-07-28 PROCEDURE — 85730 THROMBOPLASTIN TIME PARTIAL: CPT

## 2025-07-28 PROCEDURE — 2580000003 HC RX 258: Performed by: CASE MANAGER/CARE COORDINATOR

## 2025-07-28 PROCEDURE — 93459 L HRT ART/GRFT ANGIO: CPT | Performed by: INTERNAL MEDICINE

## 2025-07-28 PROCEDURE — C1725 CATH, TRANSLUMIN NON-LASER: HCPCS | Performed by: INTERNAL MEDICINE

## 2025-07-28 PROCEDURE — 99153 MOD SED SAME PHYS/QHP EA: CPT | Performed by: INTERNAL MEDICINE

## 2025-07-28 PROCEDURE — 96375 TX/PRO/DX INJ NEW DRUG ADDON: CPT

## 2025-07-28 PROCEDURE — 6360000002 HC RX W HCPCS: Performed by: INTERNAL MEDICINE

## 2025-07-28 PROCEDURE — 6360000004 HC RX CONTRAST MEDICATION: Performed by: INTERNAL MEDICINE

## 2025-07-28 PROCEDURE — 6360000002 HC RX W HCPCS: Performed by: CASE MANAGER/CARE COORDINATOR

## 2025-07-28 PROCEDURE — 2500000003 HC RX 250 WO HCPCS: Performed by: INTERNAL MEDICINE

## 2025-07-28 PROCEDURE — C1894 INTRO/SHEATH, NON-LASER: HCPCS | Performed by: INTERNAL MEDICINE

## 2025-07-28 PROCEDURE — C1887 CATHETER, GUIDING: HCPCS | Performed by: INTERNAL MEDICINE

## 2025-07-28 DEVICE — STENT ONYXNG30018UX ONYX 3.00X18RX
Type: IMPLANTABLE DEVICE | Status: FUNCTIONAL
Brand: ONYX FRONTIER™

## 2025-07-28 DEVICE — STENT ONYXNG25038UX ONYX 2.50X38RX
Type: IMPLANTABLE DEVICE | Status: FUNCTIONAL
Brand: ONYX FRONTIER™

## 2025-07-28 RX ORDER — SODIUM CHLORIDE 9 MG/ML
INJECTION, SOLUTION INTRAVENOUS CONTINUOUS
Status: ACTIVE | OUTPATIENT
Start: 2025-07-28 | End: 2025-07-28

## 2025-07-28 RX ORDER — DIPHENHYDRAMINE HYDROCHLORIDE 50 MG/ML
INJECTION, SOLUTION INTRAMUSCULAR; INTRAVENOUS PRN
Status: DISCONTINUED | OUTPATIENT
Start: 2025-07-28 | End: 2025-07-28 | Stop reason: HOSPADM

## 2025-07-28 RX ORDER — SODIUM CHLORIDE 0.9 % (FLUSH) 0.9 %
5-40 SYRINGE (ML) INJECTION PRN
Status: DISCONTINUED | OUTPATIENT
Start: 2025-07-28 | End: 2025-07-29 | Stop reason: HOSPADM

## 2025-07-28 RX ORDER — HEPARIN SODIUM 1000 [USP'U]/ML
4000 INJECTION, SOLUTION INTRAVENOUS; SUBCUTANEOUS PRN
Status: DISCONTINUED | OUTPATIENT
Start: 2025-07-28 | End: 2025-07-28

## 2025-07-28 RX ORDER — HEPARIN SODIUM 10000 [USP'U]/100ML
5-30 INJECTION, SOLUTION INTRAVENOUS CONTINUOUS
Status: DISCONTINUED | OUTPATIENT
Start: 2025-07-28 | End: 2025-07-28 | Stop reason: SDUPTHER

## 2025-07-28 RX ORDER — CLOPIDOGREL BISULFATE 75 MG/1
TABLET ORAL PRN
Status: DISCONTINUED | OUTPATIENT
Start: 2025-07-28 | End: 2025-07-28 | Stop reason: HOSPADM

## 2025-07-28 RX ORDER — SODIUM CHLORIDE 9 MG/ML
INJECTION, SOLUTION INTRAVENOUS PRN
Status: DISCONTINUED | OUTPATIENT
Start: 2025-07-28 | End: 2025-07-29 | Stop reason: HOSPADM

## 2025-07-28 RX ORDER — KETOROLAC TROMETHAMINE 15 MG/ML
15 INJECTION, SOLUTION INTRAMUSCULAR; INTRAVENOUS
Status: COMPLETED | OUTPATIENT
Start: 2025-07-28 | End: 2025-07-28

## 2025-07-28 RX ORDER — EZETIMIBE 10 MG/1
10 TABLET ORAL DAILY
Status: DISCONTINUED | OUTPATIENT
Start: 2025-07-28 | End: 2025-07-29 | Stop reason: HOSPADM

## 2025-07-28 RX ORDER — PANTOPRAZOLE SODIUM 40 MG/1
40 TABLET, DELAYED RELEASE ORAL
Status: DISCONTINUED | OUTPATIENT
Start: 2025-07-28 | End: 2025-07-29 | Stop reason: HOSPADM

## 2025-07-28 RX ORDER — ACETAMINOPHEN 650 MG/1
650 SUPPOSITORY RECTAL EVERY 6 HOURS PRN
Status: DISCONTINUED | OUTPATIENT
Start: 2025-07-28 | End: 2025-07-29 | Stop reason: HOSPADM

## 2025-07-28 RX ORDER — ASPIRIN 81 MG/1
324 TABLET, CHEWABLE ORAL
Status: COMPLETED | OUTPATIENT
Start: 2025-07-28 | End: 2025-07-28

## 2025-07-28 RX ORDER — POTASSIUM CHLORIDE 1500 MG/1
40 TABLET, EXTENDED RELEASE ORAL PRN
Status: DISCONTINUED | OUTPATIENT
Start: 2025-07-28 | End: 2025-07-29 | Stop reason: HOSPADM

## 2025-07-28 RX ORDER — HEPARIN SODIUM 1000 [USP'U]/ML
2000 INJECTION, SOLUTION INTRAVENOUS; SUBCUTANEOUS PRN
Status: DISCONTINUED | OUTPATIENT
Start: 2025-07-28 | End: 2025-07-28

## 2025-07-28 RX ORDER — HEPARIN SODIUM 10000 [USP'U]/ML
INJECTION, SOLUTION INTRAVENOUS; SUBCUTANEOUS PRN
Status: DISCONTINUED | OUTPATIENT
Start: 2025-07-28 | End: 2025-07-28 | Stop reason: HOSPADM

## 2025-07-28 RX ORDER — MAGNESIUM SULFATE IN WATER 40 MG/ML
2000 INJECTION, SOLUTION INTRAVENOUS PRN
Status: DISCONTINUED | OUTPATIENT
Start: 2025-07-28 | End: 2025-07-29 | Stop reason: HOSPADM

## 2025-07-28 RX ORDER — ACETAMINOPHEN 325 MG/1
650 TABLET ORAL EVERY 6 HOURS PRN
Status: DISCONTINUED | OUTPATIENT
Start: 2025-07-28 | End: 2025-07-29 | Stop reason: HOSPADM

## 2025-07-28 RX ORDER — ALLOPURINOL 100 MG/1
100 TABLET ORAL DAILY
Status: DISCONTINUED | OUTPATIENT
Start: 2025-07-28 | End: 2025-07-29 | Stop reason: HOSPADM

## 2025-07-28 RX ORDER — POLYETHYLENE GLYCOL 3350 17 G/17G
17 POWDER, FOR SOLUTION ORAL DAILY PRN
Status: DISCONTINUED | OUTPATIENT
Start: 2025-07-28 | End: 2025-07-29 | Stop reason: HOSPADM

## 2025-07-28 RX ORDER — METOPROLOL SUCCINATE 50 MG/1
50 TABLET, EXTENDED RELEASE ORAL DAILY
Status: DISCONTINUED | OUTPATIENT
Start: 2025-07-28 | End: 2025-07-29 | Stop reason: HOSPADM

## 2025-07-28 RX ORDER — HEPARIN SODIUM 1000 [USP'U]/ML
2000 INJECTION, SOLUTION INTRAVENOUS; SUBCUTANEOUS PRN
Status: DISCONTINUED | OUTPATIENT
Start: 2025-07-28 | End: 2025-07-28 | Stop reason: SDUPTHER

## 2025-07-28 RX ORDER — HEPARIN SODIUM 1000 [USP'U]/ML
4000 INJECTION, SOLUTION INTRAVENOUS; SUBCUTANEOUS PRN
Status: DISCONTINUED | OUTPATIENT
Start: 2025-07-28 | End: 2025-07-28 | Stop reason: SDUPTHER

## 2025-07-28 RX ORDER — FUROSEMIDE 40 MG/1
40 TABLET ORAL SEE ADMIN INSTRUCTIONS
Status: DISCONTINUED | OUTPATIENT
Start: 2025-07-28 | End: 2025-07-29 | Stop reason: HOSPADM

## 2025-07-28 RX ORDER — ASPIRIN 81 MG/1
81 TABLET, CHEWABLE ORAL DAILY
Status: DISCONTINUED | OUTPATIENT
Start: 2025-07-29 | End: 2025-07-29 | Stop reason: HOSPADM

## 2025-07-28 RX ORDER — HEPARIN SODIUM 1000 [USP'U]/ML
4000 INJECTION, SOLUTION INTRAVENOUS; SUBCUTANEOUS ONCE
Status: COMPLETED | OUTPATIENT
Start: 2025-07-28 | End: 2025-07-28

## 2025-07-28 RX ORDER — MAGNESIUM HYDROXIDE/ALUMINUM HYDROXICE/SIMETHICONE 120; 1200; 1200 MG/30ML; MG/30ML; MG/30ML
SUSPENSION ORAL PRN
Status: DISCONTINUED | OUTPATIENT
Start: 2025-07-28 | End: 2025-07-28 | Stop reason: HOSPADM

## 2025-07-28 RX ORDER — LIDOCAINE HYDROCHLORIDE 10 MG/ML
INJECTION, SOLUTION INFILTRATION; PERINEURAL PRN
Status: DISCONTINUED | OUTPATIENT
Start: 2025-07-28 | End: 2025-07-28 | Stop reason: HOSPADM

## 2025-07-28 RX ORDER — SODIUM CHLORIDE 0.9 % (FLUSH) 0.9 %
5-40 SYRINGE (ML) INJECTION EVERY 12 HOURS SCHEDULED
Status: DISCONTINUED | OUTPATIENT
Start: 2025-07-28 | End: 2025-07-29 | Stop reason: HOSPADM

## 2025-07-28 RX ORDER — IOPAMIDOL 755 MG/ML
INJECTION, SOLUTION INTRAVASCULAR PRN
Status: DISCONTINUED | OUTPATIENT
Start: 2025-07-28 | End: 2025-07-28 | Stop reason: HOSPADM

## 2025-07-28 RX ORDER — ROSUVASTATIN CALCIUM 20 MG/1
40 TABLET, COATED ORAL NIGHTLY
Status: DISCONTINUED | OUTPATIENT
Start: 2025-07-28 | End: 2025-07-29 | Stop reason: HOSPADM

## 2025-07-28 RX ORDER — FERROUS SULFATE 325(65) MG
325 TABLET ORAL
Status: DISCONTINUED | OUTPATIENT
Start: 2025-07-28 | End: 2025-07-29 | Stop reason: HOSPADM

## 2025-07-28 RX ORDER — HEPARIN SODIUM 200 [USP'U]/100ML
INJECTION, SOLUTION INTRAVENOUS CONTINUOUS PRN
Status: COMPLETED | OUTPATIENT
Start: 2025-07-28 | End: 2025-07-28

## 2025-07-28 RX ORDER — HEPARIN SODIUM 10000 [USP'U]/100ML
5-30 INJECTION, SOLUTION INTRAVENOUS CONTINUOUS
Status: DISCONTINUED | OUTPATIENT
Start: 2025-07-28 | End: 2025-07-28

## 2025-07-28 RX ORDER — POTASSIUM CHLORIDE 7.45 MG/ML
10 INJECTION INTRAVENOUS PRN
Status: DISCONTINUED | OUTPATIENT
Start: 2025-07-28 | End: 2025-07-29 | Stop reason: HOSPADM

## 2025-07-28 RX ORDER — ASPIRIN 81 MG/1
324 TABLET, CHEWABLE ORAL
Status: DISCONTINUED | OUTPATIENT
Start: 2025-07-28 | End: 2025-07-28

## 2025-07-28 RX ORDER — DOXAZOSIN 4 MG/1
4 TABLET ORAL DAILY
Status: DISCONTINUED | OUTPATIENT
Start: 2025-07-28 | End: 2025-07-29 | Stop reason: HOSPADM

## 2025-07-28 RX ADMIN — ASPIRIN 324 MG: 81 TABLET, CHEWABLE ORAL at 08:29

## 2025-07-28 RX ADMIN — EZETIMIBE 10 MG: 10 TABLET ORAL at 08:20

## 2025-07-28 RX ADMIN — PANTOPRAZOLE SODIUM 40 MG: 40 TABLET, DELAYED RELEASE ORAL at 08:20

## 2025-07-28 RX ADMIN — NITROGLYCERIN 0.5 INCH: 20 OINTMENT TOPICAL at 08:30

## 2025-07-28 RX ADMIN — HEPARIN SODIUM 4000 UNITS: 1000 INJECTION, SOLUTION INTRAVENOUS; SUBCUTANEOUS at 03:52

## 2025-07-28 RX ADMIN — ALLOPURINOL 100 MG: 100 TABLET ORAL at 08:20

## 2025-07-28 RX ADMIN — KETOROLAC TROMETHAMINE 15 MG: 15 INJECTION, SOLUTION INTRAMUSCULAR; INTRAVENOUS at 01:21

## 2025-07-28 RX ADMIN — ROSUVASTATIN CALCIUM 40 MG: 20 TABLET, FILM COATED ORAL at 20:52

## 2025-07-28 RX ADMIN — HEPARIN SODIUM AND DEXTROSE 11 UNITS/KG/HR: 10000; 5 INJECTION INTRAVENOUS at 03:59

## 2025-07-28 RX ADMIN — SODIUM CHLORIDE: 0.9 INJECTION, SOLUTION INTRAVENOUS at 05:39

## 2025-07-28 RX ADMIN — FERROUS SULFATE TAB 325 MG (65 MG ELEMENTAL FE) 325 MG: 325 (65 FE) TAB at 08:20

## 2025-07-28 RX ADMIN — SODIUM CHLORIDE, PRESERVATIVE FREE 10 ML: 5 INJECTION INTRAVENOUS at 20:57

## 2025-07-28 RX ADMIN — DOXAZOSIN 4 MG: 4 TABLET ORAL at 08:20

## 2025-07-28 RX ADMIN — NITROGLYCERIN 1 INCH: 20 OINTMENT TOPICAL at 02:47

## 2025-07-28 RX ADMIN — METOPROLOL SUCCINATE 50 MG: 50 TABLET, EXTENDED RELEASE ORAL at 08:20

## 2025-07-28 RX ADMIN — SODIUM CHLORIDE, PRESERVATIVE FREE 10 ML: 5 INJECTION INTRAVENOUS at 08:21

## 2025-07-28 ASSESSMENT — PAIN SCALES - GENERAL
PAINLEVEL_OUTOF10: 0
PAINLEVEL_OUTOF10: 1

## 2025-07-28 ASSESSMENT — ENCOUNTER SYMPTOMS: BACK PAIN: 1

## 2025-07-28 NOTE — H&P
[facility] Initial Cardiac Evaluation                Date of  Admission: 7/27/2025 11:48 PM     Primary Cardiologist: Dr. Lyons  Referring Physician: Dr. Zeng  Supervising Physician: Dr. Martin    Reason for consult/admission: Chest pain      Joaquim Ricci is a 79 y.o. male with past medical history of CABG-LIMA-LAD (1989), total NAOMI x 4--(last [facility] in 2014?),  Longstanding persistent atrial fibrillation (S/P. Watchman device 2023), HTN, HLD Rheumatic Fever (as a child), Aortic Stenosis, ZAINAB (infusions outpatient), GIB w/recent single balloon enteroscopy w/ cauterization of multiple sites with no reported bleeding following procedure (May 2025) who presented to [facility] ED on 7/27 with back pressure with radiation to upper arms followed by chest pressure that began at 11 PM shortly before bed.  When discomfort would not subside despite rest or repositioning, patient called EMS and received nitroglycerin sublingually with significant relief of discomfort en route.  Pain is non-pleuritic, positional, or reproducible to palpation.  Associated symptoms include clamminess and tachypnea, but denies any exertional shortness of breath, dizziness, nausea, vomiting, presyncope, or palpitations.      Workup in the ER:  Vitals: 129/90, HR 96, RR 16, SpO2 96% on RA, T97.9.F.  EKG: Atrial fibrillation, rate 78-not acutely ischemic  CXR:  no acute findings of the chest  K3.7, no Mg ordered, BUN 39, CR 1.51, glucose 180, troponin T: 27.5, 142.0 respectively.  , AST 39, WBC 4.18, Hgb 10.7, , negative for COVID.  ED interventions/dispo:  pt was given Toradol and Nitropaste.  Cardiology has been consulted for admission for further workup of symptoms.            Past Medical History:   Diagnosis Date    CAD (coronary artery disease)     Hypertension       Past Surgical History:   Procedure Laterality Date    BACK SURGERY      3 back surgeries  most recent 2022    CARDIOVERSION

## 2025-07-28 NOTE — PLAN OF CARE
Problem: Discharge Planning  Goal: Discharge to home or other facility with appropriate resources  Outcome: Progressing  Flowsheets  Taken 7/28/2025 0820 by Zina Gao RN  Discharge to home or other facility with appropriate resources:   Identify barriers to discharge with patient and caregiver   Arrange for needed discharge resources and transportation as appropriate   Identify discharge learning needs (meds, wound care, etc)   Refer to discharge planning if patient needs post-hospital services based on physician order or complex needs related to functional status, cognitive ability or social support system  Taken 7/28/2025 0547 by Du Jones RN  Discharge to home or other facility with appropriate resources:   Identify barriers to discharge with patient and caregiver   Identify discharge learning needs (meds, wound care, etc)   Refer to discharge planning if patient needs post-hospital services based on physician order or complex needs related to functional status, cognitive ability or social support system   Arrange for needed discharge resources and transportation as appropriate   Arrange for interpreters to assist at discharge as needed  Taken 7/28/2025 0500 by Du Jones RN  Discharge to home or other facility with appropriate resources:   Identify barriers to discharge with patient and caregiver   Arrange for needed discharge resources and transportation as appropriate   Arrange for interpreters to assist at discharge as needed   Refer to discharge planning if patient needs post-hospital services based on physician order or complex needs related to functional status, cognitive ability or social support system     Problem: Pain  Goal: Verbalizes/displays adequate comfort level or baseline comfort level  Outcome: Progressing  Flowsheets (Taken 7/28/2025 0500 by Du Jones RN)  Verbalizes/displays adequate comfort level or baseline comfort level:   Encourage patient to

## 2025-07-28 NOTE — CARE COORDINATION
Pt presented to [facility] ED via EMS on 7/27/25 c/o back pressure with radiation to upper arms followed by chest pressure. PMHx includes: CABG, longstanding persistent AFib, HTN, HLD, Rheumatic Fever (as a child), Aortic Stenosis, ZAINAB (infusions outpatient) and GIB. PTA, pt indep with all his ADLs. Lives with his spouse in a one story private residence. A/Ox4. Drives. On RA. Denies DME needs or current HH services. Pt is current with OPPT at [facility]. PCP established, last saw Dr. Aguilar 3 mos ago and generally sees him about once a year. [place] and able to afford home meds. No anticipated discharge needs identified by CM. Will remain available.     07/28/25 1244   Service Assessment   Patient Orientation Alert and Oriented   Cognition Alert   History Provided By Patient   Primary Caregiver Self   Support Systems Spouse/Significant Other;Children;[employer];Friends/Neighbors   Patient's Healthcare Decision Maker is: Legal Next of Kin   PCP Verified by CM No   Prior Functional Level Independent in ADLs/IADLs   Current Functional Level Independent in ADLs/IADLs   Can patient return to prior living arrangement Yes   Ability to make needs known: Good   Family able to assist with home care needs: Yes   Would you like for me to discuss the discharge plan with any other family members/significant others, and if so, who? No   Financial Resources Medicare   Community Resources None   Social/Functional History   Lives With Spouse   Type of Home House   Home Layout One level   Home Access Stairs to enter with rails   Entrance Stairs - Number of Steps 3   Entrance Stairs - Rails Both   Bathroom Toilet Standard   Bathroom Accessibility Accessible   Home Equipment None   Receives Help From Family;Friend(s)   Prior Level of Assist for ADLs Independent   Prior Level of Assist for Homemaking Independent   Ambulation Assistance Independent   Prior Level of Assist for Transfers Independent   Active

## 2025-07-28 NOTE — ED NOTES
TRANSFER - OUT REPORT:    Verbal report given to RN on Joaquim Ricci  being transferred to [facility] for routine progression of patient care       Report consisted of patient's Situation, Background, Assessment and   Recommendations(SBAR).     Information from the following report(s) ED SBAR was reviewed with the receiving nurse.    [place] Assessment:    Presents to emergency department  because of falls (Syncope, seizure, or loss of consciousness): No  Age > 70: Yes  Altered Mental Status, Intoxication with alcohol or substance confusion (Disorientation, impaired judgment, poor safety awaremess, or inability to follow instructions): No  Impaired Mobility: Ambulates or transfers with assistive devices or assistance; Unable to ambulate or transer.: No  Nursing Judgement: No          Lines:   Peripheral IV 07/28/25 Left;Anterior Forearm (Active)        Opportunity for questions and clarification was provided.      Patient transported with:  Registered Nurse          Concepción Daniels RN  07/28/25 4195

## 2025-07-28 NOTE — DISCHARGE INSTRUCTIONS
DISPOSITION: The patient is being discharged home in stable condition on a low saturated fat, low cholesterol and low salt diet. The patient is instructed to advance activities as tolerated to the limit of fatigue or shortness of breath. The patient is instructed to avoid all heavy lifting for 5 days. The patient is instructed to watch the cath site for bleeding/oozing; if seen, the patient is instructed to apply firm pressure with a clean cloth and call [facility] at [phone]. The patient is instructed to watch for signs of infection which include: increasing area of redness, fever/hot to touch or purulent drainage at the catheterization site. The patient is instructed not to soak in a bathtub for 7-10 days, but is cleared to shower. The patient is instructed to call the office or return to the ER for immediate evaluation for any shortness of breath or chest pain not relieved by NTG.     HEART CATHETERIZATION/ANGIOGRAPHY DISCHARGE INSTRUCTIONS     Check puncture site frequently for swelling or bleeding. If there is any bleeding, apply pressure over the area with a clean towel or washcloth and call 911. Notify your doctor for any redness, swelling, drainage, or oozing from the puncture site. Notify your doctor for any fever or chills.  If the extremity becomes cold, numb, or painful call Dr. Caraballo at [phone].  Activity should be limited for the next 48 hours. No heavy lifting, pushing, pulling  or strenuous activity for 48 hours. No heavy lifting (anything over 10 pounds) for 3 days.  You may resume your usual diet. Drink more fluids than usual.  Have a responsible person drive you home and stay with you for at least 24 hours after your heart catheterization/angiography.  You may remove bandage from your Right groin in 24 hours. You may shower in 24 hours. No tub baths, hot tubs, or swimming for 1 week. Do not place any lotions, creams, powders, or ointments over puncture site for 1 week. You may place a

## 2025-07-28 NOTE — ED TRIAGE NOTES
Pt arrives from home via [facility] with compliant of chest pain that began approximately 1 hour prior to arrival. Per EMS pt reported radiation into arms. 324 ASA at home, 2 SL NTG with EMS   General Sunscreen Counseling: I recommended a broad spectrum sunscreen with a SPF of 30 or higher. Sunscreens should be applied at least 15 minutes prior to expected sun exposure and then every 2 hours after that as long as sun exposure continues. If swimming or exercising, consider applying every 45 minutes to an hour after getting wet or sweating.   I also recommended sun protective clothing and shade when possible. Detail Level: Detailed

## 2025-07-28 NOTE — ED PROVIDER NOTES
Emergency Department Provider Signout / Continuation of Care Note         DISPOSITION Decision To Admit 07/28/2025 02:41:55 AM       ICD-10-CM    1. Acute coronary syndrome (HCC)  I24.9           The patient's care was signed out to me at shift change.      Final Plan      Patient left over in the emergency department with a second troponin pending.  He presented with chest and back pain that woke him up at 11 PM.  He had associated nausea clamminess and shortness of breath.  Nitroglycerin helped prior to arrival.  Initial troponin was 27 but subsequent troponin is gone up to 142.  I have just now rechecked the patient and he remains pain-free so I will provide a full dose aspirin and an inch of Nitropaste.  Cardiology consulted for admission  ED Course as of 07/28/25 0245 Mon Jul 28, 2025   0130 I talked the patient about the findings in the emergency department.  Patient is feeling well and not having any pain currently.  We are going to get the repeat troponin and as long as his second troponin is stable plan to discharge him to home.  Patient is agreeable with this plan. [KH]      ED Course User Index  [KH] Howard Ludwig, DO       1 or more acute illnesses that pose a threat to life or bodily function.   Drug therapy given requiring intensive monitoring for toxicity.  Shared medical decision making was utilized in creating the patients health plan today.       ED cardiac monitoring rhythm strip was ordered and interpreted:  atrial fibrillation rate controlled  ST Segments:Nonspecific ST segments - NO STEMI   Rate: 82  I interpreted the X-rays chest x-ray shows no acute pathology.  ED provider's independent EKG interpretation A-fib rate controlled, no STEMI-rate 70  The patient was admitted and I have discussed patient management with the admitting provider.             Howard Zeng MD  07/28/25 0243    
upper arms bilaterally then started coming around to the mid back and and into the chest.  Patient says it is made worse with coughing.  The patient says that he had not been experiencing any sinus congestion until this evening.  His wife did just test positive for COVID-19 today.  The patient has not had any fevers or chills.  He is not a productive cough.  He was given 2 sublingual nitroglycerin and 324 of aspirin by EMS and route to the emergency department.  The chest pain he is experiencing is a tightness which he said was about a 2 out of 10 but after the medicine it is down to barely detectable.  The patient denies any other acute symptoms.  Patient does have a history of coronary artery disease and atrial fibrillation.  He has had a Watchman procedure so he is no longer on any blood thinners.          ROS     Review of Systems   Cardiovascular:  Positive for chest pain.   Musculoskeletal:  Positive for back pain and myalgias.        Physical Exam     Vitals signs and nursing note reviewed:  Vitals:    07/27/25 2354 07/28/25 0000 07/28/25 0030   BP: (!) 129/90 122/79 132/86   Pulse: 96 84 88   Resp: 16 15 13   Temp: 97.9 °F (36.6 °C)     TempSrc: Oral     SpO2: 96% 94%    Weight: 86.2 kg (190 lb)     Height: 1.702 m (5' 7\")        Physical Exam     GENERAL:The patient has Body mass index is 29.76 kg/m². Well-hydrated.  No acute distress  VITAL SIGNS: Heart rate, blood pressure, respiratory rate reviewed as recorded in  nurse's notes  EYES: Pupils reactive. Extraocular motion intact. No conjunctival redness or drainage.  EARS: No external masses or lesions.  NOSE: No nasal drainage or epistaxis.  MOUTH/THROAT: Pharynx clear; airway patent.  NECK: Supple, no meningeal signs. Trachea midline. No masses or thyromegaly.  LUNGS: Breath sounds clear and equal bilaterally no accessory muscle use.  CHEST: No deformity  CARDIOVASCULAR: Regular rate and rhythm  ABDOMEN: Soft without tenderness. No palpable masses or

## 2025-07-29 VITALS
BODY MASS INDEX: 28.44 KG/M2 | OXYGEN SATURATION: 95 % | SYSTOLIC BLOOD PRESSURE: 114 MMHG | DIASTOLIC BLOOD PRESSURE: 73 MMHG | TEMPERATURE: 98.4 F | HEART RATE: 77 BPM | WEIGHT: 181.22 LBS | RESPIRATION RATE: 20 BRPM | HEIGHT: 67 IN

## 2025-07-29 PROBLEM — D64.9 NORMOCHROMIC ANEMIA: Status: ACTIVE | Noted: 2025-07-29

## 2025-07-29 LAB
ANION GAP SERPL CALC-SCNC: 10 MMOL/L (ref 7–16)
BUN SERPL-MCNC: 31 MG/DL (ref 8–23)
CALCIUM SERPL-MCNC: 8.3 MG/DL (ref 8.8–10.2)
CHLORIDE SERPL-SCNC: 109 MMOL/L (ref 98–107)
CHOLEST SERPL-MCNC: 134 MG/DL (ref 0–200)
CO2 SERPL-SCNC: 22 MMOL/L (ref 20–29)
CREAT SERPL-MCNC: 1.48 MG/DL (ref 0.8–1.3)
ERYTHROCYTE [DISTWIDTH] IN BLOOD BY AUTOMATED COUNT: 16.1 % (ref 11.9–14.6)
GLUCOSE SERPL-MCNC: 131 MG/DL (ref 70–99)
HCT VFR BLD AUTO: 33.5 % (ref 41.1–50.3)
HDLC SERPL-MCNC: 49 MG/DL (ref 40–60)
HDLC SERPL: 2.7 (ref 0–5)
HGB BLD-MCNC: 10.5 G/DL (ref 13.6–17.2)
LDLC SERPL CALC-MCNC: 67 MG/DL (ref 0–100)
MAGNESIUM SERPL-MCNC: 2.5 MG/DL (ref 1.8–2.4)
MCH RBC QN AUTO: 29.8 PG (ref 26.1–32.9)
MCHC RBC AUTO-ENTMCNC: 31.3 G/DL (ref 31.4–35)
MCV RBC AUTO: 95.2 FL (ref 82–102)
NRBC # BLD: 0 K/UL (ref 0–0.2)
PLATELET # BLD AUTO: 125 K/UL (ref 150–450)
PMV BLD AUTO: 10.9 FL (ref 9.4–12.3)
POTASSIUM SERPL-SCNC: 4.2 MMOL/L (ref 3.5–5.1)
RBC # BLD AUTO: 3.52 M/UL (ref 4.23–5.6)
SODIUM SERPL-SCNC: 141 MMOL/L (ref 136–145)
TRIGL SERPL-MCNC: 92 MG/DL (ref 0–150)
VLDLC SERPL CALC-MCNC: 18 MG/DL (ref 6–23)
WBC # BLD AUTO: 5.5 K/UL (ref 4.3–11.1)

## 2025-07-29 PROCEDURE — 80061 LIPID PANEL: CPT

## 2025-07-29 PROCEDURE — 83735 ASSAY OF MAGNESIUM: CPT

## 2025-07-29 PROCEDURE — 36415 COLL VENOUS BLD VENIPUNCTURE: CPT

## 2025-07-29 PROCEDURE — 6370000000 HC RX 637 (ALT 250 FOR IP): Performed by: CASE MANAGER/CARE COORDINATOR

## 2025-07-29 PROCEDURE — 99238 HOSP IP/OBS DSCHRG MGMT 30/<: CPT | Performed by: INTERNAL MEDICINE

## 2025-07-29 PROCEDURE — 80048 BASIC METABOLIC PNL TOTAL CA: CPT

## 2025-07-29 PROCEDURE — 6370000000 HC RX 637 (ALT 250 FOR IP): Performed by: NURSE PRACTITIONER

## 2025-07-29 PROCEDURE — 2500000003 HC RX 250 WO HCPCS: Performed by: CASE MANAGER/CARE COORDINATOR

## 2025-07-29 PROCEDURE — G0378 HOSPITAL OBSERVATION PER HR: HCPCS

## 2025-07-29 PROCEDURE — 85027 COMPLETE CBC AUTOMATED: CPT

## 2025-07-29 RX ORDER — CLOPIDOGREL BISULFATE 75 MG/1
75 TABLET ORAL DAILY
Qty: 90 TABLET | Refills: 3 | Status: SHIPPED | OUTPATIENT
Start: 2025-07-30 | End: 2025-07-29

## 2025-07-29 RX ORDER — CLOPIDOGREL BISULFATE 75 MG/1
75 TABLET ORAL DAILY
Qty: 90 TABLET | Refills: 3 | Status: SHIPPED | OUTPATIENT
Start: 2025-07-30

## 2025-07-29 RX ORDER — CLOPIDOGREL BISULFATE 75 MG/1
75 TABLET ORAL DAILY
Status: DISCONTINUED | OUTPATIENT
Start: 2025-07-29 | End: 2025-07-29 | Stop reason: HOSPADM

## 2025-07-29 RX ADMIN — PANTOPRAZOLE SODIUM 40 MG: 40 TABLET, DELAYED RELEASE ORAL at 06:33

## 2025-07-29 RX ADMIN — METOPROLOL SUCCINATE 50 MG: 50 TABLET, EXTENDED RELEASE ORAL at 08:02

## 2025-07-29 RX ADMIN — ASPIRIN 81 MG: 81 TABLET, CHEWABLE ORAL at 08:02

## 2025-07-29 RX ADMIN — ALLOPURINOL 100 MG: 100 TABLET ORAL at 08:02

## 2025-07-29 RX ADMIN — EZETIMIBE 10 MG: 10 TABLET ORAL at 08:02

## 2025-07-29 RX ADMIN — FERROUS SULFATE TAB 325 MG (65 MG ELEMENTAL FE) 325 MG: 325 (65 FE) TAB at 08:02

## 2025-07-29 RX ADMIN — SODIUM CHLORIDE, PRESERVATIVE FREE 10 ML: 5 INJECTION INTRAVENOUS at 08:02

## 2025-07-29 RX ADMIN — CLOPIDOGREL BISULFATE 75 MG: 75 TABLET, FILM COATED ORAL at 08:02

## 2025-07-29 ASSESSMENT — PAIN SCALES - GENERAL
PAINLEVEL_OUTOF10: 0
PAINLEVEL_OUTOF10: 0

## 2025-07-29 NOTE — CARE COORDINATION
Discharge order is in. Pt underwent cardiac catheterization by Dr. Martin and tolerated the procedure well. Pt is discharging home today in stable condition. Pt indicated that he wants to participate in the BS CRP, liaison scheduled his orientation. No other discharge needs identified by CM, tx goals met.     07/29/25 1256   Services At/After Discharge   Transition of Care Consult (CM Consult) Discharge Planning   Services At/After Discharge PT;Outpatient    Resource Information Provided? No   Mode of Transport at Discharge Other (see comment)  (Family)   Confirm Follow Up Transport Family   Condition of Participation: Discharge Planning   The Patient and/or Patient Representative was provided with a Choice of Provider? Patient   The Patient and/Or Patient Representative agree with the Discharge Plan? Yes   Freedom of Choice list was provided with basic dialogue that supports the patient's individualized plan of care/goals, treatment preferences, and shares the quality data associated with the providers?  Yes

## 2025-07-29 NOTE — PLAN OF CARE
Problem: Discharge Planning  Goal: Discharge to home or other facility with appropriate resources  7/29/2025 0916 by Zina Gao RN  Outcome: Adequate for Discharge  Flowsheets (Taken 7/29/2025 0802)  Discharge to home or other facility with appropriate resources:   Identify barriers to discharge with patient and caregiver   Arrange for needed discharge resources and transportation as appropriate   Identify discharge learning needs (meds, wound care, etc)   Refer to discharge planning if patient needs post-hospital services based on physician order or complex needs related to functional status, cognitive ability or social support system  7/29/2025 0405 by Israel Stokes RN  Outcome: Progressing  Flowsheets (Taken 7/28/2025 2015)  Discharge to home or other facility with appropriate resources:   Identify barriers to discharge with patient and caregiver   Arrange for needed discharge resources and transportation as appropriate   Identify discharge learning needs (meds, wound care, etc)     Problem: Pain  Goal: Verbalizes/displays adequate comfort level or baseline comfort level  7/29/2025 0916 by Zina Gao RN  Outcome: Adequate for Discharge  7/29/2025 0405 by Israel Stokes RN  Outcome: Progressing  Flowsheets (Taken 7/28/2025 2015)  Verbalizes/displays adequate comfort level or baseline comfort level:   Encourage patient to monitor pain and request assistance   Assess pain using appropriate pain scale     Problem: Safety - Adult  Goal: Free from fall injury  7/29/2025 0916 by Zina Gao RN  Outcome: Adequate for Discharge  7/29/2025 0405 by Israel Stokes RN  Outcome: Progressing  Flowsheets (Taken 7/28/2025 2015)  Free From Fall Injury: Instruct family/caregiver on patient safety

## 2025-07-29 NOTE — PLAN OF CARE
Problem: Discharge Planning  Goal: Discharge to home or other facility with appropriate resources  Outcome: Progressing  Flowsheets (Taken 7/28/2025 2015)  Discharge to home or other facility with appropriate resources:   Identify barriers to discharge with patient and caregiver   Arrange for needed discharge resources and transportation as appropriate   Identify discharge learning needs (meds, wound care, etc)     Problem: Pain  Goal: Verbalizes/displays adequate comfort level or baseline comfort level  Outcome: Progressing  Flowsheets (Taken 7/28/2025 2015)  Verbalizes/displays adequate comfort level or baseline comfort level:   Encourage patient to monitor pain and request assistance   Assess pain using appropriate pain scale     Problem: Safety - Adult  Goal: Free from fall injury  Outcome: Progressing  Flowsheets (Taken 7/28/2025 2015)  Free From Fall Injury: Instruct family/caregiver on patient safety

## 2025-07-29 NOTE — DISCHARGE SUMMARY
known as: PROTONIX     PSYLLIUM PO               Where to Get Your Medications        These medications were sent to [facility]/pharmacy #[id number] - [place], [place] - [address] - P [phone] - F [phone]  [address] [place] [address], [place] [address]      Phone: [phone]   clopidogrel 75 MG tablet       Note some or all of the above medications that were set to stop by the system but the pt was not taking in the outpatient setting.  Limitations in the system make it appear that we have stopped the medications when we have not.         Signed:  Sujey Hicks PA-C  7/29/2025  12:41 PM

## 2025-07-29 NOTE — PLAN OF CARE
Problem: Discharge Planning  Goal: Discharge to home or other facility with appropriate resources  7/29/2025 1255 by Phyllis Negron RN  Outcome: Completed  7/29/2025 0916 by Zina Gao RN  Outcome: Adequate for Discharge  Flowsheets (Taken 7/29/2025 0802)  Discharge to home or other facility with appropriate resources:   Identify barriers to discharge with patient and caregiver   Arrange for needed discharge resources and transportation as appropriate   Identify discharge learning needs (meds, wound care, etc)   Refer to discharge planning if patient needs post-hospital services based on physician order or complex needs related to functional status, cognitive ability or social support system  7/29/2025 0405 by Israel Stokes RN  Outcome: Progressing  Flowsheets (Taken 7/28/2025 2015)  Discharge to home or other facility with appropriate resources:   Identify barriers to discharge with patient and caregiver   Arrange for needed discharge resources and transportation as appropriate   Identify discharge learning needs (meds, wound care, etc)     Problem: Pain  Goal: Verbalizes/displays adequate comfort level or baseline comfort level  7/29/2025 1255 by Phyllsi Negron RN  Outcome: Completed  7/29/2025 0916 by Zina Gao RN  Outcome: Adequate for Discharge  7/29/2025 0405 by Israel Stokes RN  Outcome: Progressing  Flowsheets (Taken 7/28/2025 2015)  Verbalizes/displays adequate comfort level or baseline comfort level:   Encourage patient to monitor pain and request assistance   Assess pain using appropriate pain scale     Problem: Safety - Adult  Goal: Free from fall injury  7/29/2025 1255 by Phyllis Negron RN  Outcome: Completed  7/29/2025 0916 by Zina Gao RN  Outcome: Adequate for Discharge  7/29/2025 0405 by Israel Stokes RN  Outcome: Progressing  Flowsheets (Taken 7/28/2025 2015)  Free From Fall Injury: Instruct family/caregiver on patient safety

## 2025-07-29 NOTE — PROGRESS NOTES
[facility] CARDIOLOGY PROGRESS NOTE           7/29/2025 7:40 AM    Admit Date: 7/27/2025      Subjective:   Patient denies any chest pain.  Mild bruising at his left radial access site.  Remains in atrial fibrillation.  No ventricular arrhythmias.  Echo with normal LV function yesterday    ROS:  Cardiovascular:  As noted above    Objective:      Vitals:    07/28/25 1800 07/28/25 2016 07/28/25 2348 07/29/25 0348   BP: 119/82 113/74 105/71 117/66   Pulse: 69 74 61 72   Resp:  16 17 16   Temp:  98.1 °F (36.7 °C) 97.9 °F (36.6 °C) 97.7 °F (36.5 °C)   TempSrc:  Oral Oral Oral   SpO2:  97% 96% 94%   Weight:    82.2 kg (181 lb 3.5 oz)   Height:           Physical Exam:  General-No Acute Distress  Neck- supple, no JVD  CV- regular rate and rhythm no MRG  Lung- clear bilaterally  Abd- soft, nontender, nondistended  Ext- no edema bilaterally.  Skin- warm and dry      Data Review:   Recent Labs     07/29/25  0511 07/28/25  0001   WBC 5.5 4.8   HGB 10.5* 10.7*   HCT 33.5* 33.3*   MCV 95.2 93.8   * 141*       Recent Labs     07/29/25  0511 07/28/25  0522 07/28/25  0001      < > 139   K 4.2   < > 3.7   *   < > 105   CO2 22   < > 23   BUN 31*   < > 39*   CREATININE 1.48*   < > 1.51*   GLUCOSE 131*   < > 180*   CALCIUM 8.3*   < > 8.8   BILITOT  --   --  0.3   ALKPHOS  --   --  148*   AST  --   --  38*   ALT  --   --  43   LABGLOM 48*   < > 46*   GLOB  --   --  3.3    < > = values in this interval not displayed.       No results for input(s): \"CKTOTAL\", \"CKMB\", \"CKMBINDEX\", \"DDIMER\", \"TROPONINI\" in the last 720 hours.    Echo (7/28/25):    Left Ventricle: Low normal left ventricular systolic function with a visually estimated EF of 50 - 55%. Left ventricle size is normal. Mildly increased wall thickness. Normal wall motion. Abnormal diastolic function.    Right Ventricle: Right ventricle size is normal. Normal systolic function.    Left Atrium: Left atrium is moderately dilated.    Right Atrium: 
1645 Hematoma formed at left radial incision site after a total of 4 mL air removed from TR band. Manual pressure applied and 4 mL reinstilled. Hematoma soft to touch, distal pulses +2, no bleeding. Notified ISMA Jo.     1740 Hematoma beginning to harden again. Manual pressure applied. Patient complaining of numbness in fingers. TR band on. Distal pulses +2, no bleeding.     Per ISMA Jo, remove TR band and hold manual pressure. TR band removed at 1825, dressing with gauze and tegaderm. Distal pulses +2, no pain, numbess, tingling, oozing.   
4 Eyes Skin Assessment     NAME:  Joaquim Ricci  YOB: 1945  MEDICAL RECORD NUMBER:  [id number]    The patient is being assessed for  Admission    I agree that at least one RN has performed a thorough Head to Toe Skin Assessment on the patient. ALL assessment sites listed below have been assessed.      Areas assessed by both nurses:    Head, Face, Ears, Shoulders, Back, Chest, Arms, Elbows, Hands, Sacrum. Buttock, Coccyx, Ischium, and Legs. Feet and Heels        Does the Patient have a Wound? No noted wound(s)       Rashard Prevention initiated by RN: Yes  Wound Care Orders initiated by RN: No    For hospital-acquired stage 1 & 2 and ALL Stage 3,4, Unstageable, DTI, NWPT, and Complex wounds: place order “IP Wound Care/Ostomy Nurse Eval and Treat” by RN under : No    New Ostomies, if present place, Ostomy referral order under : No     Nurse 1 eSignature: Electronically signed by Du Jones RN on 7/28/25 at 6:21 AM EDT    **SHARE this note so that the co-signing nurse can place an eSignature**    Nurse 2 eSignature: Electronically signed by Braden Laurent RN on 7/28/25 at 6:32 AM EDT    
Cardiac Rehab: Spoke with patient regarding referral to cardiac rehab. Patient meets admission criteria based on NSTEMI with PCI (7/28/25).  Written information about Cardiac Rehab given and reviewed with patient. Discussed lifestyle modifications to promote cardiac wellness. Patient indicated that he wants to participate in the cardiac rehab program and his orientation appt has been scheduled for the [facility] Cardiac Rehab program. His Cardiologist is Dr. Lyons.      Thank you,  Comfort Woodard, RN, BSN  Cardiopulmonary Rehabilitation Nurse Liaison  [facility]    
Discharge instructions were reviewed with patient. An opportunity was given for questions. All medications were reviewed, and information was given on new medications, if any. Patient verbalized understanding, and has no questions at this time. IV's and telemetry box removed.    
Report received from Lucie Cath Lab RN. Procedural finding communicated. Intra procedural medication administration reviewed. Progression of care discussed.    Patient received into CPRU room 7, Post PCI    Access site without bleeding or swelling. Left radial    Patient instructed to limit movement of left upper extremity.    Routine post procedural vital signs & site assessment initiated.  
TRANSFER - IN REPORT:    Verbal report received from Brigid ROBERSON on Joaquim Ricci  being received from Cath Lab for routine progression of patient care      Report consisted of patient's Situation, Background, Assessment and   Recommendations(SBAR).     Information from the following report(s) Nurse Handoff Report, Surgery Report, Med Rec Status, and Quality Measures was reviewed with the receiving nurse.    Opportunity for questions and clarification was provided.      Assessment completed upon patient's arrival to unit and care assumed.    
TRANSFER - OUT REPORT:    Verbal report given to DA tran on Joaquim Ricci  being transferred to CPRU for ordered procedure       Report consisted of patient’s Situation, Background, Assessment and Recommendations(SBAR).     Information from the following report(s) Procedure Summary, MAR, and Med Rec Status was reviewed with the receiving nurse.    Opportunity for questions and clarification was provided.      C by Dr. cavazos  Right radial access  X2 stents to RCA  Right wrist TR band with 15 mL  75mcg Fentanyl  11,000 units of heparin  30 mL maalox  Access site soft. Clean, dry, and intact; with no signs of bleeding or hematoma  Pt. Tolerated procedure     
TRANSFER - OUT REPORT:    Verbal report given to Zina ROBERSON on Joaquim Ricci  being transferred to [facility] for routine progression of patient care       Report consisted of patient's Situation, Background, Assessment and   Recommendations(SBAR).     Information from the following report(s) Nurse Handoff Report was reviewed with the receiving nurse.           Lines:   Peripheral IV 07/28/25 Posterior;Right Hand (Active)        Opportunity for questions and clarification was provided.      Patient transported with:  Monitor and Registered Nurse        
  Outcome: (P) Acceptance, Comfort, Engaged in conversation, Expressed feelings, needs, and concerns    Patient Plan of Care:   Plan and Referrals  Plan/Referrals: (P) Continue Support (comment)     Electronically signed by MARIANN Morgan, on 7/28/2025 at 10:36 AM.  [facility]  [phone]

## 2025-07-30 ENCOUNTER — TELEPHONE (OUTPATIENT)
Age: 80
End: 2025-07-30

## 2025-07-30 NOTE — TELEPHONE ENCOUNTER
Care Transitions Initial Follow Up Call    Call within 2 business days of discharge: Yes     Patient: Joaquim Ricci Patient : 1945 MRN: [id number]      RARS: Readmission Risk Score: 10.9       Spoke with: Patient    Discharge department/facility: [facility]    Non-face-to-face services provided:  Home in stable condition on a low saturated fat, low cholesterol and low salt diet. The patient is instructed to advance activities as tolerated to the limit of fatigue or shortness of breath. The patient is instructed to avoid all heavy lifting for 5 days. The patient is instructed to watch the cath site for bleeding/oozing; if seen, the patient is instructed to apply firm pressure with a clean cloth and call [facility] Cardiology at [phone]. The patient is instructed to watch for signs of infection which include: increasing area of redness, fever/hot to touch or purulent drainage at the catheterization site. The patient is instructed not to soak in a bathtub for 7-10 days, but is cleared to shower. The patient is instructed to call the office or return to the ER for immediate evaluation for any shortness of breath or chest pain not relieved by NTG.   Patient reports he is doing well- slept well last night. Has not removed dressing to cath site. Advised to monitor site for s/s of infection once dressing removed. Patient was able to get any new prescriptions. Aware of upcoming appointment on 25 at 11:15 am. Call office with questions or concerns.    Follow Up  Future Appointments   Date Time Provider Department Center   2025 11:15 AM Armaan Lyons MD [facility]   2025  9:30 AM Gabriel Marquez, PT [facility]   8/15/2025  9:30 AM Gabriel Marquez, PT [facility]   2025  9:30 AM Gabriel Marqeuz, PT [facility]   2025 12:30 PM Comfort Woodard RN [facility]   2025  9:30 AM Gabriel Marquez, PT [facility]   2025  9:30 AM Gabriel Marquez PT [facility]   2025  9:30 AM Gabriel Marquez,

## 2025-08-01 ENCOUNTER — APPOINTMENT (OUTPATIENT)
Dept: PHYSICAL THERAPY | Age: 80
End: 2025-08-01
Payer: MEDICARE

## 2025-08-04 ENCOUNTER — APPOINTMENT (OUTPATIENT)
Dept: PHYSICAL THERAPY | Age: 80
End: 2025-08-04
Payer: MEDICARE

## 2025-08-06 ENCOUNTER — OFFICE VISIT (OUTPATIENT)
Age: 80
End: 2025-08-06

## 2025-08-06 VITALS
BODY MASS INDEX: 26.53 KG/M2 | SYSTOLIC BLOOD PRESSURE: 122 MMHG | WEIGHT: 169 LBS | DIASTOLIC BLOOD PRESSURE: 50 MMHG | HEIGHT: 67 IN | HEART RATE: 64 BPM

## 2025-08-06 DIAGNOSIS — D50.9 IRON DEFICIENCY ANEMIA, UNSPECIFIED IRON DEFICIENCY ANEMIA TYPE: ICD-10-CM

## 2025-08-06 DIAGNOSIS — I48.0 PAROXYSMAL ATRIAL FIBRILLATION (HCC): ICD-10-CM

## 2025-08-06 DIAGNOSIS — Z95.818 PRESENCE OF WATCHMAN LEFT ATRIAL APPENDAGE CLOSURE DEVICE: Primary | ICD-10-CM

## 2025-08-06 RX ORDER — METFORMIN HYDROCHLORIDE 500 MG/1
500 TABLET, EXTENDED RELEASE ORAL DAILY
COMMUNITY
Start: 2025-07-31 | End: 2026-07-31

## 2025-08-06 RX ORDER — FUROSEMIDE 40 MG/1
40 TABLET ORAL SEE ADMIN INSTRUCTIONS
Qty: 60 TABLET | Refills: 2
Start: 2025-08-06

## 2025-08-06 RX ORDER — POLYETHYLENE GLYCOL 3350 17 G/17G
17 POWDER, FOR SOLUTION ORAL DAILY
Qty: 1530 G | Refills: 3 | Status: SHIPPED | OUTPATIENT
Start: 2025-08-06 | End: 2025-09-05

## 2025-08-11 LAB
ACT BLD: 198 SECS (ref 79–138)
ACT BLD: 400 SECS (ref 79–138)

## 2025-08-13 ENCOUNTER — HOSPITAL ENCOUNTER (OUTPATIENT)
Dept: PHYSICAL THERAPY | Age: 80
Setting detail: RECURRING SERIES
Discharge: HOME | End: 2025-08-16
Payer: MEDICARE

## 2025-08-13 PROCEDURE — 97110 THERAPEUTIC EXERCISES: CPT

## 2025-08-13 PROCEDURE — 97140 MANUAL THERAPY 1/> REGIONS: CPT

## 2025-08-15 ENCOUNTER — APPOINTMENT (OUTPATIENT)
Dept: PHYSICAL THERAPY | Age: 80
End: 2025-08-15
Payer: MEDICARE

## 2025-08-18 ENCOUNTER — HOSPITAL ENCOUNTER (OUTPATIENT)
Dept: CARDIAC REHAB | Age: 80
Setting detail: RECURRING SERIES
Discharge: HOME | End: 2025-08-21
Payer: MEDICARE

## 2025-08-18 ENCOUNTER — HOSPITAL ENCOUNTER (OUTPATIENT)
Dept: PHYSICAL THERAPY | Age: 80
Setting detail: RECURRING SERIES
Discharge: HOME | End: 2025-08-21
Payer: MEDICARE

## 2025-08-18 PROCEDURE — 97140 MANUAL THERAPY 1/> REGIONS: CPT

## 2025-08-18 PROCEDURE — 97110 THERAPEUTIC EXERCISES: CPT

## 2025-08-18 RX ORDER — PHENOL 1.4 %
1 AEROSOL, SPRAY (ML) MUCOUS MEMBRANE DAILY
COMMUNITY

## 2025-08-18 ASSESSMENT — PATIENT HEALTH QUESTIONNAIRE - PHQ9
SUM OF ALL RESPONSES TO PHQ QUESTIONS 1-9: 3
7. TROUBLE CONCENTRATING ON THINGS, SUCH AS READING THE NEWSPAPER OR WATCHING TELEVISION: NOT AT ALL
5. POOR APPETITE OR OVEREATING: NOT AT ALL
SUM OF ALL RESPONSES TO PHQ QUESTIONS 1-9: 3
SUM OF ALL RESPONSES TO PHQ QUESTIONS 1-9: 3
3. TROUBLE FALLING OR STAYING ASLEEP: NOT AT ALL
1. LITTLE INTEREST OR PLEASURE IN DOING THINGS: NOT AT ALL
9. THOUGHTS THAT YOU WOULD BE BETTER OFF DEAD, OR OF HURTING YOURSELF: NOT AT ALL
SUM OF ALL RESPONSES TO PHQ QUESTIONS 1-9: 3
8. MOVING OR SPEAKING SO SLOWLY THAT OTHER PEOPLE COULD HAVE NOTICED. OR THE OPPOSITE, BEING SO FIGETY OR RESTLESS THAT YOU HAVE BEEN MOVING AROUND A LOT MORE THAN USUAL: SEVERAL DAYS
2. FEELING DOWN, DEPRESSED OR HOPELESS: NOT AT ALL
6. FEELING BAD ABOUT YOURSELF - OR THAT YOU ARE A FAILURE OR HAVE LET YOURSELF OR YOUR FAMILY DOWN: NOT AT ALL
4. FEELING TIRED OR HAVING LITTLE ENERGY: MORE THAN HALF THE DAYS
10. IF YOU CHECKED OFF ANY PROBLEMS, HOW DIFFICULT HAVE THESE PROBLEMS MADE IT FOR YOU TO DO YOUR WORK, TAKE CARE OF THINGS AT HOME, OR GET ALONG WITH OTHER PEOPLE: NOT DIFFICULT AT ALL

## 2025-08-20 ENCOUNTER — HOSPITAL ENCOUNTER (OUTPATIENT)
Dept: PHYSICAL THERAPY | Age: 80
Setting detail: RECURRING SERIES
Discharge: HOME | End: 2025-08-23
Payer: MEDICARE

## 2025-08-20 PROCEDURE — 97140 MANUAL THERAPY 1/> REGIONS: CPT

## 2025-08-22 ENCOUNTER — HOSPITAL ENCOUNTER (OUTPATIENT)
Dept: CARDIAC REHAB | Age: 80
Setting detail: RECURRING SERIES
Discharge: HOME | End: 2025-08-25
Payer: MEDICARE

## 2025-08-22 PROCEDURE — 93798 PHYS/QHP OP CAR RHAB W/ECG: CPT

## 2025-08-25 ENCOUNTER — HOSPITAL ENCOUNTER (OUTPATIENT)
Dept: PHYSICAL THERAPY | Age: 80
Setting detail: RECURRING SERIES
Discharge: HOME | End: 2025-08-28
Payer: MEDICARE

## 2025-08-25 PROCEDURE — 97110 THERAPEUTIC EXERCISES: CPT

## 2025-08-25 PROCEDURE — 97140 MANUAL THERAPY 1/> REGIONS: CPT

## 2025-08-27 ENCOUNTER — APPOINTMENT (OUTPATIENT)
Dept: PHYSICAL THERAPY | Age: 80
End: 2025-08-27
Payer: MEDICARE

## 2025-08-29 ENCOUNTER — HOSPITAL ENCOUNTER (OUTPATIENT)
Dept: CARDIAC REHAB | Age: 80
Setting detail: RECURRING SERIES
Discharge: HOME | End: 2025-09-01
Payer: MEDICARE

## 2025-08-29 PROCEDURE — 93798 PHYS/QHP OP CAR RHAB W/ECG: CPT

## 2025-09-03 ENCOUNTER — HOSPITAL ENCOUNTER (OUTPATIENT)
Dept: CARDIAC REHAB | Age: 80
Setting detail: RECURRING SERIES
Discharge: HOME | End: 2025-09-06
Payer: MEDICARE

## 2025-09-03 PROCEDURE — 93798 PHYS/QHP OP CAR RHAB W/ECG: CPT

## (undated) DEVICE — CATHETER GUID 6FR L150CM RAP EXCHG L25CM TIP 5.1FR PUSHROD

## (undated) DEVICE — ACCESS SHEATH WITH DILATOR: Brand: WATCHMAN FXD CURVE™ ACCESS SYSTEM

## (undated) DEVICE — PERCLOSE™ PROSTYLE™ SUTURE-MEDIATED CLOSURE AND REPAIR SYSTEM: Brand: PERCLOSE™ PROSTYLE™

## (undated) DEVICE — CATHETER ANGIO 5FR L100CM GRY S STL NYL JL3.5 3 SEG BRAID L

## (undated) DEVICE — PINNACLE INTRODUCER SHEATH: Brand: PINNACLE

## (undated) DEVICE — STERILE (15.2 TAPERED TO 7.6 X 183CM) POLYETHYLENE ACCORDION-FOLDED COVER FOR USE WITH SIEMENS ACUNAV ULTRASOUND CATHETER FAMILY CONNECTOR: Brand: SWIFTLINK TRANSDUCER COVER

## (undated) DEVICE — CATHETER REPROC ULTRASOUND 10 FR ACUSON ACUNAV

## (undated) DEVICE — CATH BLLN ANGIO 3X15MM NC EUPHORIA RX

## (undated) DEVICE — GLIDESHEATH SLENDER STAINLESS STEEL KIT: Brand: GLIDESHEATH SLENDER

## (undated) DEVICE — CATH BLLN ANGIO 3.50X15MM NC EUPHORA RX

## (undated) DEVICE — CATHETER COR DIAG PIGTAILS PIG 145 CRV 5FR 110CM 6 SIDE H

## (undated) DEVICE — GUIDEWIRE 035IN 210CM PTFE COAT FIX COR J TIP 15MM FIRM BODY

## (undated) DEVICE — BAND COMPR L24CM REG CLR PLAS HEMSTAT EXT HK AND LOOP RETEN

## (undated) DEVICE — CATHETER DIAG AD 5FR L100CM COR GRY HYDRPHLC NYL IM W/O

## (undated) DEVICE — Device: Brand: PROWATER

## (undated) DEVICE — CATHETER 6FR CORDIS PIG STRAIGHT 110CM

## (undated) DEVICE — 1 X VERSACROSS CONNECT TRANSSEPTAL DILATOR (INCLUDING 1 X J-TIP MECHANICAL GUIDEWIRE); 1 X VERSACROSS RF WIRE (INCLUDING 1 X CONNECTOR CABLE (SINGLE USE)); 1 X DISPERSIVE ELECTRODE: Brand: VERSACROSS CONNECT LAAC ACCESS SOLUTION

## (undated) DEVICE — GUIDE NDL ST W/ 14 X 147CM TELESCOPICALLY FLD CIV FLX CVR

## (undated) DEVICE — HI-TORQUE WHISPER MS GUIDE WIRE .014 STRAIGHT TIP 3.0 CM X 190 CM: Brand: HI-TORQUE WHISPER

## (undated) DEVICE — CATH BLLN ANGIO 2.50X20MM SC EUPHORA RX

## (undated) DEVICE — CATH BLLN ANGIO 2.50X20MM NC EUPHORIA RX

## (undated) DEVICE — CATHETER ETER GUID 6FR 100CM 070IN JUDKINS R 4 VASC COR W O

## (undated) DEVICE — CHECK-FLO PERFORMER INTRODUCER: Brand: PERFORMER